# Patient Record
Sex: MALE | URBAN - METROPOLITAN AREA
[De-identification: names, ages, dates, MRNs, and addresses within clinical notes are randomized per-mention and may not be internally consistent; named-entity substitution may affect disease eponyms.]

---

## 2019-04-13 ENCOUNTER — INPATIENT (INPATIENT)
Facility: HOSPITAL | Age: 74
LOS: 11 days | Discharge: HOME HEALTH | DRG: 233 | End: 2019-04-25
Attending: THORACIC SURGERY (CARDIOTHORACIC VASCULAR SURGERY) | Admitting: HOSPITALIST
Payer: MEDICAID

## 2019-04-13 VITALS
RESPIRATION RATE: 16 BRPM | SYSTOLIC BLOOD PRESSURE: 147 MMHG | OXYGEN SATURATION: 95 % | HEART RATE: 82 BPM | DIASTOLIC BLOOD PRESSURE: 100 MMHG

## 2019-04-13 LAB
APTT BLD: 101.3 SEC — HIGH (ref 27.5–36.3)
BASOPHILS # BLD AUTO: 0 K/UL — SIGNIFICANT CHANGE UP (ref 0–0.2)
BASOPHILS NFR BLD AUTO: 0.3 % — SIGNIFICANT CHANGE UP (ref 0–2)
EOSINOPHIL # BLD AUTO: 0 K/UL — SIGNIFICANT CHANGE UP (ref 0–0.5)
EOSINOPHIL NFR BLD AUTO: 0.2 % — SIGNIFICANT CHANGE UP (ref 0–6)
HCT VFR BLD CALC: 41.5 % — SIGNIFICANT CHANGE UP (ref 39–50)
HGB BLD-MCNC: 14.6 G/DL — SIGNIFICANT CHANGE UP (ref 13–17)
INR BLD: 1.26 RATIO — HIGH (ref 0.88–1.16)
LYMPHOCYTES # BLD AUTO: 1.4 K/UL — SIGNIFICANT CHANGE UP (ref 1–3.3)
LYMPHOCYTES # BLD AUTO: 12.5 % — LOW (ref 13–44)
MCHC RBC-ENTMCNC: 33.4 PG — SIGNIFICANT CHANGE UP (ref 27–34)
MCHC RBC-ENTMCNC: 35.3 GM/DL — SIGNIFICANT CHANGE UP (ref 32–36)
MCV RBC AUTO: 94.7 FL — SIGNIFICANT CHANGE UP (ref 80–100)
MONOCYTES # BLD AUTO: 0.7 K/UL — SIGNIFICANT CHANGE UP (ref 0–0.9)
MONOCYTES NFR BLD AUTO: 6.8 % — SIGNIFICANT CHANGE UP (ref 2–14)
NEUTROPHILS # BLD AUTO: 8.7 K/UL — HIGH (ref 1.8–7.4)
NEUTROPHILS NFR BLD AUTO: 80.2 % — HIGH (ref 43–77)
PLATELET # BLD AUTO: 175 K/UL — SIGNIFICANT CHANGE UP (ref 150–400)
PROTHROM AB SERPL-ACNC: 14.5 SEC — HIGH (ref 10–12.9)
RBC # BLD: 4.38 M/UL — SIGNIFICANT CHANGE UP (ref 4.2–5.8)
RBC # FLD: 12.3 % — SIGNIFICANT CHANGE UP (ref 10.3–14.5)
WBC # BLD: 10.9 K/UL — HIGH (ref 3.8–10.5)
WBC # FLD AUTO: 10.9 K/UL — HIGH (ref 3.8–10.5)

## 2019-04-13 PROCEDURE — 99285 EMERGENCY DEPT VISIT HI MDM: CPT | Mod: 25

## 2019-04-13 PROCEDURE — 93010 ELECTROCARDIOGRAM REPORT: CPT

## 2019-04-13 RX ORDER — HEPARIN SODIUM 5000 [USP'U]/ML
INJECTION INTRAVENOUS; SUBCUTANEOUS
Qty: 25000 | Refills: 0 | Status: DISCONTINUED | OUTPATIENT
Start: 2019-04-13 | End: 2019-04-15

## 2019-04-13 RX ORDER — HEPARIN SODIUM 5000 [USP'U]/ML
4000 INJECTION INTRAVENOUS; SUBCUTANEOUS EVERY 6 HOURS
Qty: 0 | Refills: 0 | Status: DISCONTINUED | OUTPATIENT
Start: 2019-04-13 | End: 2019-04-15

## 2019-04-13 RX ADMIN — HEPARIN SODIUM 950 UNIT(S)/HR: 5000 INJECTION INTRAVENOUS; SUBCUTANEOUS at 23:15

## 2019-04-13 NOTE — ED ADULT NURSE NOTE - NSIMPLEMENTINTERV_GEN_ALL_ED
Implemented All Fall with Harm Risk Interventions:  [place] to call system. Call bell, personal items and telephone within reach. Instruct patient to call for assistance. Room bathroom lighting operational. Non-slip footwear when patient is off stretcher. Physically safe environment: no spills, clutter or unnecessary equipment. Stretcher in lowest position, wheels locked, appropriate side rails in place. Provide visual cue, wrist band, yellow gown, etc. Monitor gait and stability. Monitor for mental status changes and reorient to person, place, and time. Review medications for side effects contributing to fall risk. Reinforce activity limits and safety measures with patient and family. Provide visual clues: red socks.

## 2019-04-13 NOTE — ED PROVIDER NOTE - CLINICAL SUMMARY MEDICAL DECISION MAKING FREE TEXT BOX
romel transfer from [facility] cp with rosa maria avr, st depression v4-6 - pos trop - given asa, brelenta and heparin - pt endorses dec cp - cards consult for cath contin ue hepatrin admit tele v ccu v cath lab

## 2019-04-13 NOTE — ED ADULT NURSE NOTE - OBJECTIVE STATEMENT
75 yo m transferred from [facility] for [facility]. PMH of ILDA. 73 yo m transferred from [facility] for NSTEMI. PMH of GERD. Pt is primarily Serbian speaking,  used at this time. Pt reported to [facility] today for vomiting, diaphoresis, & epigastric pain x3 days. Pt was found to have RBBB on EKG & positive troponin. Prior to arrival pt received  mg, 1" Nitroglycerin paste (@ 19:45 that has been removed since pt reported headache), Brilinta (180mg @ 1746), Heparin (5000 unit bolus @ 2121 & 760 units/hr infusing @ 2121) 4 mg of IV Zofran, 20 mg of IV pepcid, 650 mg of PO tylenol, & 2.5 mg of IV metropolol (for elevated BP as per EMS) prior to arrival. Here pt weighed @ 76.6 kg. MD bernal. Rate of heparin infusion adjusted to 950 units/hour as per order by MD Ramirez. Blood work drawn & sent to lab, 18 gauge established in [facility]. 18 gauge in left ac placed prior to arrival. Pt AAOx3, PERRL, NAD, lungs clear bilat, abdomen soft nontender nondistended, strong peripheral pulses x 4, cap refill < 2 seconds, skin warm and dry. Pt denies headache, dizziness, chest pain, palpitations, cough, SOB, abdominal pain, n/v/d, urinary symptoms, fevers, chills, weakness at this time. Pt pending cardiology evaluation. 75 yo m transferred from [facility] for NSTEMI. PMH of GERD. Pt is primarily Czech speaking,  used at this time. Pt reported to [facility] today for vomiting, diaphoresis, & epigastric pain x3 days. Pt was found to have RBBB on EKG & positive troponin. Prior to arrival pt received  mg, 1" Nitroglycerin paste (@ 19:45 that has been removed since pt reported headache), Brilinta (180mg @ 1746), Heparin (5000 unit bolus @ 2121 & 760 units/hr infusion @ 2121) 4 mg of IV Zofran, 20 mg of IV pepcid, 650 mg of PO tylenol, & 2.5 mg of IV Metoprolol (for elevated BP as per EMS) prior to arrival. Here pt weighed @ 76.6 kg. MD bernal. Rate of heparin infusion adjusted to 950 units/hour as per order by MD Ramirez. Blood work drawn & sent to lab, 18 gauge established in [facility]. 18 gauge in left ac placed prior to arrival. Pt AAOx3, PERRL, NAD, lungs clear bilat, abdomen soft nontender nondistended, strong peripheral pulses x 4, cap refill < 2 seconds, skin warm and dry. Pt denies headache, dizziness, chest pain, palpitations, cough, SOB, abdominal pain, n/v/d, urinary symptoms, fevers, chills, weakness at this time. Pt pending cardiology evaluation. 73 yo m transferred from [facility] for NSTEMI. PMH of GERD. Pt is primarily Persian speaking,  used at this time - interpreterID: [id number]//name: Darío. Pt reported to [facility] today for vomiting, diaphoresis, & epigastric pain x3 days. Pt was found to have RBBB on EKG & positive troponin. As per EMS, prior to arrival pt received  mg, 1" Nitroglycerin paste (@ 19:45 that has been removed since pt reported headache), Brilinta (180mg @ 1746), Heparin (5000 unit bolus @ 2121 & 760 units/hr infusion @ 2121) 4 mg of IV Zofran, 20 mg of IV pepcid, 650 mg of PO tylenol, & 2.5 mg of IV Metoprolol (for elevated BP as per EMS) prior to arrival. Here pt weighed @ 76.6 kg. MD bernal. Rate of heparin infusion adjusted to 950 units/hour as per order by MD Ramirez. Blood work drawn & sent to lab, 18 gauge established in [facility]. 18 gauge in left ac placed prior to arrival. Pt AAOx3, PERRL, NAD, lungs clear bilat, abdomen soft nontender nondistended, strong peripheral pulses x 4, cap refill < 2 seconds, skin warm and dry. Pt denies headache, dizziness, chest pain, palpitations, cough, SOB, abdominal pain, n/v/d, urinary symptoms, fevers, chills, weakness at this time. Pt pending cardiology evaluation. 73 yo m transferred from [facility] for NSTEMI. PMH of GERD & HTN. Pt is primarily American speaking,  used at this time - interpreterID: [id number]//name: Darío. Pt reported to [facility] today for vomiting, diaphoresis, & epigastric pain x3 days. Pt was found to have RBBB on EKG & positive troponin. As per EMS, prior to arrival pt received ASA (325 mg), 1" Nitroglycerin paste (that has been removed since pt reported headache), Brilinta (180mg @ 1746), Heparin (5000 unit bolus @ 2121 & 760 units/hr infusion @ 2121) 4 mg of IV Zofran, 20 mg of IV pepcid, 650 mg of PO tylenol, & 2.5 mg of IV Metoprolol (for elevated BP as per EMS) prior to arrival. Here pt weighed @ 76.6 kg. MD bernal. Rate of heparin infusion adjusted to 950 units/hour as per order by MD Ramirez as based on ACS Heparin Nomogram. Blood work drawn & sent to lab, 18 gauge established in [facility]. 18 gauge in left ac placed prior to arrival. Pt AAOx3, PERRL, NAD, respirations spontaneous, non-labored, lungs clear bilat, NSR on CM, abdomen soft nontender nondistended, strong peripheral pulses x 4, cap refill < 2 seconds, skin warm and dry. Pt denies headache, dizziness, chest pain, palpitations, cough, SOB, abdominal pain, n/v/d, urinary symptoms, fevers, chills, weakness at this time. Pt pending cardiology evaluation. 75 yo m transferred from [facility] for Cardiology Evaluation. PMH of GERD & HTN. Pt is primarily Maltese speaking,  used at this time - interpreterID: [id number]//name: Darío. Pt reported to [facility] today for vomiting, diaphoresis, & epigastric pain x3 days. Pt was found to have RBBB on EKG & positive troponin. As per EMS, prior to arrival pt received ASA (325 mg), 1" Nitroglycerin paste (that has been removed since pt reported headache), Brilinta (180mg @ 1746), Heparin (5000 unit bolus @ 2121 & 760 units/hr infusion @ 2121) 4 mg of IV Zofran, 20 mg of IV pepcid, 650 mg of PO tylenol, & 2.5 mg of IV Metoprolol (for elevated BP as per EMS) prior to arrival. Here pt weighed @ 76.6 kg. MD bernal. Rate of heparin infusion adjusted to 950 units/hour as per order by MD Ramirez as based on ACS Heparin Nomogram. Blood work drawn & sent to lab, 18 gauge established in [facility]. 18 gauge in left ac placed prior to arrival. Pt AAOx3, PERRL, NAD, respirations spontaneous, non-labored, lungs clear bilat, NSR on CM, abdomen soft nontender nondistended, strong peripheral pulses x 4, cap refill < 2 seconds, skin warm and dry. Pt denies headache, dizziness, chest pain, palpitations, cough, SOB, abdominal pain, n/v/d, urinary symptoms, fevers, chills, weakness at this time. Pt pending cardiology evaluation.

## 2019-04-13 NOTE — ED PROVIDER NOTE - NS ED ROS FT
Constitutional: no fever, no chills.  Eyes: no visual changes.  ENMT: no sore throat.  CV: +chest pain.  Resp: no cough, no shortness of breath.  GI: no abdominal pain, no nausea, no vomiting, no diarrhea.  : no dysuria, no hematuria.  MSK: no back pain, no neck pain.  Skin: no rashes.  Neuro: no headache, no loss of consciousness, no weakness, no numbness, no tingling.

## 2019-04-13 NOTE — ED PROVIDER NOTE - OBJECTIVE STATEMENT
Resident: 73y M PMH HTN BiBEMS from outside hospital for elevated troponin. Patient is a poor historian, states that when he exerts himself he develops stomach pain and esophagus pain. This has been going on for a long time, but today pain was worse so he went to the hospital. Pain is "in my esophagus," non-radiating. He has a history of a peptic ulcer surgery a long time ago. Does not have any known cardiac history, caths, or stents. Former smoker. Per EMS report, patient received aspirin, Brilinta, nitro, and heparin gtt prior to arrival.

## 2019-04-13 NOTE — ED PROVIDER NOTE - PHYSICAL EXAMINATION
Resident:   Gen: no acute distress  Head: NC, AT  ENT: PERRL, MMM, no uvular deviation, no tonsilar erythema  Neck: supple with full ROM   Chest: CTAB, no retractions, rate normal, appears to breathe comfortably  Heart: RRR S1S2, No peripheral edema, bilateral pulses in arms and legs  Abd: Soft non-tender, no rebound or guarding  Ext: Moving all 4 extremities without obvious impairment to ROM, no obvious weakness  Neuro: fluid speech  Psych: No anxiety, depression or pressured speech noted  Skin: no urticaria, no diffuse rash

## 2019-04-13 NOTE — ED PROVIDER NOTE - PROGRESS NOTE DETAILS
continue heparin DONTE Godfrey MD : endorsed pending cards eval in setting of ekg changes - trops markedly pos, cards made aware again of pos trops. will see in ED. admit for further eval.

## 2019-04-14 DIAGNOSIS — Z29.9 ENCOUNTER FOR PROPHYLACTIC MEASURES, UNSPECIFIED: ICD-10-CM

## 2019-04-14 DIAGNOSIS — Z90.3 ACQUIRED ABSENCE OF STOMACH [PART OF]: Chronic | ICD-10-CM

## 2019-04-14 DIAGNOSIS — N18.3 CHRONIC KIDNEY DISEASE, STAGE 3 (MODERATE): ICD-10-CM

## 2019-04-14 DIAGNOSIS — E87.2 ACIDOSIS: ICD-10-CM

## 2019-04-14 DIAGNOSIS — I10 ESSENTIAL (PRIMARY) HYPERTENSION: ICD-10-CM

## 2019-04-14 DIAGNOSIS — R74.0 NONSPECIFIC ELEVATION OF LEVELS OF TRANSAMINASE AND LACTIC ACID DEHYDROGENASE [LDH]: ICD-10-CM

## 2019-04-14 DIAGNOSIS — I21.4 NON-ST ELEVATION (NSTEMI) MYOCARDIAL INFARCTION: ICD-10-CM

## 2019-04-14 DIAGNOSIS — I21.9 ACUTE MYOCARDIAL INFARCTION, UNSPECIFIED: ICD-10-CM

## 2019-04-14 LAB
ALBUMIN SERPL ELPH-MCNC: 3.8 G/DL — SIGNIFICANT CHANGE UP (ref 3.3–5)
ALP SERPL-CCNC: 83 U/L — SIGNIFICANT CHANGE UP (ref 40–120)
ALT FLD-CCNC: 31 U/L — SIGNIFICANT CHANGE UP (ref 10–45)
ANION GAP SERPL CALC-SCNC: 17 MMOL/L — SIGNIFICANT CHANGE UP (ref 5–17)
APTT BLD: 48 SEC — HIGH (ref 27.5–36.3)
APTT BLD: 64.1 SEC — HIGH (ref 27.5–36.3)
APTT BLD: 73.4 SEC — HIGH (ref 27.5–36.3)
AST SERPL-CCNC: 148 U/L — HIGH (ref 10–40)
BILIRUB SERPL-MCNC: 0.9 MG/DL — SIGNIFICANT CHANGE UP (ref 0.2–1.2)
BUN SERPL-MCNC: 15 MG/DL — SIGNIFICANT CHANGE UP (ref 7–23)
CALCIUM SERPL-MCNC: 9.3 MG/DL — SIGNIFICANT CHANGE UP (ref 8.4–10.5)
CHLORIDE SERPL-SCNC: 100 MMOL/L — SIGNIFICANT CHANGE UP (ref 96–108)
CHOLEST SERPL-MCNC: 156 MG/DL — SIGNIFICANT CHANGE UP (ref 10–199)
CO2 SERPL-SCNC: 21 MMOL/L — LOW (ref 22–31)
CREAT SERPL-MCNC: 1.1 MG/DL — SIGNIFICANT CHANGE UP (ref 0.5–1.3)
GLUCOSE SERPL-MCNC: 163 MG/DL — HIGH (ref 70–99)
HBA1C BLD-MCNC: 5.6 % — SIGNIFICANT CHANGE UP (ref 4–5.6)
HCT VFR BLD CALC: 42.9 % — SIGNIFICANT CHANGE UP (ref 39–50)
HDLC SERPL-MCNC: 34 MG/DL — LOW
HGB BLD-MCNC: 14.4 G/DL — SIGNIFICANT CHANGE UP (ref 13–17)
LIPID PNL WITH DIRECT LDL SERPL: 105 MG/DL — HIGH
MCHC RBC-ENTMCNC: 31.9 PG — SIGNIFICANT CHANGE UP (ref 27–34)
MCHC RBC-ENTMCNC: 33.7 GM/DL — SIGNIFICANT CHANGE UP (ref 32–36)
MCV RBC AUTO: 94.6 FL — SIGNIFICANT CHANGE UP (ref 80–100)
PLATELET # BLD AUTO: 162 K/UL — SIGNIFICANT CHANGE UP (ref 150–400)
POTASSIUM SERPL-MCNC: 4.9 MMOL/L — SIGNIFICANT CHANGE UP (ref 3.5–5.3)
POTASSIUM SERPL-SCNC: 4.9 MMOL/L — SIGNIFICANT CHANGE UP (ref 3.5–5.3)
PROT SERPL-MCNC: 7.5 G/DL — SIGNIFICANT CHANGE UP (ref 6–8.3)
RBC # BLD: 4.53 M/UL — SIGNIFICANT CHANGE UP (ref 4.2–5.8)
RBC # FLD: 12.6 % — SIGNIFICANT CHANGE UP (ref 10.3–14.5)
SODIUM SERPL-SCNC: 138 MMOL/L — SIGNIFICANT CHANGE UP (ref 135–145)
TOTAL CHOLESTEROL/HDL RATIO MEASUREMENT: 4.6 RATIO — SIGNIFICANT CHANGE UP (ref 3.4–9.6)
TRIGL SERPL-MCNC: 84 MG/DL — SIGNIFICANT CHANGE UP (ref 10–149)
TROPONIN T, HIGH SENSITIVITY RESULT: 1179 NG/L — HIGH (ref 0–51)
TROPONIN T, HIGH SENSITIVITY RESULT: 2116 NG/L — HIGH (ref 0–51)
TROPONIN T, HIGH SENSITIVITY RESULT: 2681 NG/L — HIGH (ref 0–51)
WBC # BLD: 10.3 K/UL — SIGNIFICANT CHANGE UP (ref 3.8–10.5)
WBC # FLD AUTO: 10.3 K/UL — SIGNIFICANT CHANGE UP (ref 3.8–10.5)

## 2019-04-14 PROCEDURE — 99223 1ST HOSP IP/OBS HIGH 75: CPT | Mod: GC

## 2019-04-14 PROCEDURE — 99255 IP/OBS CONSLTJ NEW/EST HI 80: CPT

## 2019-04-14 PROCEDURE — 71045 X-RAY EXAM CHEST 1 VIEW: CPT | Mod: 26

## 2019-04-14 RX ORDER — ISOSORBIDE DINITRATE 5 MG/1
5 TABLET ORAL THREE TIMES A DAY
Qty: 0 | Refills: 0 | Status: DISCONTINUED | OUTPATIENT
Start: 2019-04-14 | End: 2019-04-16

## 2019-04-14 RX ORDER — INFLUENZA VIRUS VACCINE 15; 15; 15; 15 UG/.5ML; UG/.5ML; UG/.5ML; UG/.5ML
0.5 SUSPENSION INTRAMUSCULAR ONCE
Qty: 0 | Refills: 0 | Status: DISCONTINUED | OUTPATIENT
Start: 2019-04-14 | End: 2019-04-18

## 2019-04-14 RX ORDER — CLOPIDOGREL BISULFATE 75 MG/1
75 TABLET, FILM COATED ORAL DAILY
Qty: 0 | Refills: 0 | Status: DISCONTINUED | OUTPATIENT
Start: 2019-04-14 | End: 2019-04-14

## 2019-04-14 RX ORDER — TICAGRELOR 90 MG/1
90 TABLET ORAL
Qty: 0 | Refills: 0 | Status: DISCONTINUED | OUTPATIENT
Start: 2019-04-14 | End: 2019-04-15

## 2019-04-14 RX ORDER — ASPIRIN/CALCIUM CARB/MAGNESIUM 324 MG
81 TABLET ORAL DAILY
Qty: 0 | Refills: 0 | Status: DISCONTINUED | OUTPATIENT
Start: 2019-04-14 | End: 2019-04-16

## 2019-04-14 RX ORDER — ATORVASTATIN CALCIUM 80 MG/1
80 TABLET, FILM COATED ORAL AT BEDTIME
Qty: 0 | Refills: 0 | Status: DISCONTINUED | OUTPATIENT
Start: 2019-04-14 | End: 2019-04-18

## 2019-04-14 RX ORDER — LOSARTAN POTASSIUM 100 MG/1
50 TABLET, FILM COATED ORAL DAILY
Qty: 0 | Refills: 0 | Status: DISCONTINUED | OUTPATIENT
Start: 2019-04-14 | End: 2019-04-16

## 2019-04-14 RX ADMIN — TICAGRELOR 90 MILLIGRAM(S): 90 TABLET ORAL at 17:46

## 2019-04-14 RX ADMIN — HEPARIN SODIUM 1100 UNIT(S)/HR: 5000 INJECTION INTRAVENOUS; SUBCUTANEOUS at 18:22

## 2019-04-14 RX ADMIN — HEPARIN SODIUM 1100 UNIT(S)/HR: 5000 INJECTION INTRAVENOUS; SUBCUTANEOUS at 12:06

## 2019-04-14 RX ADMIN — Medication 81 MILLIGRAM(S): at 12:10

## 2019-04-14 RX ADMIN — ISOSORBIDE DINITRATE 5 MILLIGRAM(S): 5 TABLET ORAL at 22:11

## 2019-04-14 RX ADMIN — ISOSORBIDE DINITRATE 5 MILLIGRAM(S): 5 TABLET ORAL at 14:33

## 2019-04-14 RX ADMIN — ATORVASTATIN CALCIUM 80 MILLIGRAM(S): 80 TABLET, FILM COATED ORAL at 22:11

## 2019-04-14 RX ADMIN — LOSARTAN POTASSIUM 50 MILLIGRAM(S): 100 TABLET, FILM COATED ORAL at 05:50

## 2019-04-14 RX ADMIN — TICAGRELOR 90 MILLIGRAM(S): 90 TABLET ORAL at 05:50

## 2019-04-14 RX ADMIN — HEPARIN SODIUM 950 UNIT(S)/HR: 5000 INJECTION INTRAVENOUS; SUBCUTANEOUS at 05:54

## 2019-04-14 NOTE — H&P ADULT - NSHPREVIEWOFSYSTEMS_GEN_ALL_CORE
CONSTITUTIONAL: No weakness, fevers or chills  EYES/ENT: No visual changes;  No vertigo or throat pain   NECK: No pain or stiffness  RESPIRATORY: No cough, wheezing, hemoptysis; No shortness of breath  CARDIOVASCULAR: +Chest pain   No palpitations  GASTROINTESTINAL: +epigastric pain. + nausea,  No vomiting, or hematemesis; No diarrhea or constipation. No melena or hematochezia.  GENITOURINARY: No dysuria, frequency or hematuria  NEUROLOGICAL: No numbness or weakness  SKIN: No itching, burning, rashes, or lesions   All other review of systems is negative unless indicated above.

## 2019-04-14 NOTE — H&P ADULT - NSHPSOCIALHISTORY_GEN_ALL_CORE
Lives in [facility] [place] with wife  Former smoker, approx 5 pack year, stopped approx 25 yrs ago  Social alcohol, beer

## 2019-04-14 NOTE — H&P ADULT - NSHPPHYSICALEXAM_GEN_ALL_CORE
General: Elderly male in NAD sitting upright on stretcher   HEENT: EOMI, MMM  Endo: No palpable thyroid   Card: RRR, No murmur   Resp: CTAB   ABD: Soft, nondistended, nontender, well healed surgical scar RUQ  : No cunha   EXT: No deformity   Neuro: CN 2-12 intact, no gross deficit   Psych: Calm

## 2019-04-14 NOTE — H&P ADULT - PROBLEM SELECTOR PLAN 1
CP, w Ischemic changes on EKG (ST depression of lateral leads, T-wave inversion inferior leads), Uptrending troponin: 1179 -> 2116 -> 2681. Pt currently asymptomatic.   -Interventional Cardiology Team aware- no current plan for urgent cardiac cath today   -Cont heparin gtt/ Brilinta/ ASA daily  -Cont to trend Troponin to peak   -Will start high dose Lipitor   -F/U A1c, Lipid Panel   -ECHO to evaluate for structural heart dz   -Appreciate Cardiology reccs CP, w Ischemic changes on EKG (ST depression of lateral leads, T-wave inversion inferior leads), Uptrending troponin: 1179 -> 2116 -> 2681. Pt currently asymptomatic.   -Interventional Cardiology Team aware- no current plan for urgent cardiac cath today   -Cont heparin gtt/ Brilinta/ ASA daily  -Cont to trend Troponin to peak   -Will start high dose Lipitor   -F/U A1c, Lipid Panel   -ECHO to evaluate for structural heart dz   -Admit to telemetry  -Appreciate Cardiology reccs CP, w Ischemic changes on EKG (ST depression of lateral leads, T-wave inversion inferior leads), Uptrending troponin: 1179 -> 2116 -> 2681. Pt currently asymptomatic.   -Interventional Cardiology Team aware- no current plan for urgent cardiac cath today   -Cont heparin gtt/ Brilinta/ ASA daily  -Cont to trend Troponin to peak   -Will start high dose Lipitor   -Will start Isordil with hold parameters for anginal pain  -F/U A1c, Lipid Panel   -ECHO to evaluate for structural heart dz   -Admit to telemetry  -Appreciate Cardiology reccs CP, w Ischemic changes on EKG (ST depression of lateral leads, T-wave inversion inferior leads), Uptrending troponin: 1179 -> 2116 -> 2681. Pt currently asymptomatic.   -Interventional Cardiology Team aware- no current plan for urgent cardiac cath today, pt currently scheduled for tmrw  -Cont heparin gtt/ Brilinta/ ASA daily  -No utility in trending additonal troponin as per cardiology   -Will start high dose Lipitor   -Will start Isordil with hold parameters for anginal pain  -F/U A1c, Lipid Panel   -ECHO to evaluate for structural heart dz   -Admit to telemetry  -Appreciate Cardiology reccs

## 2019-04-14 NOTE — ED ADULT NURSE REASSESSMENT NOTE - NS ED NURSE REASSESS COMMENT FT1
01:30. A&Ox3. PERRL. No facial droop noted. No slurred speech. Pt upper and lower extremities bilateral in strength. Pt denies headache, dizziness, blurred vision, weakness, numbness/tingling, chest pain, SOB, n/v, abdominal pain, fevers, & chills. VSS. Family at bedside. Safety & comfort measures maintained. Pt pending telemetry bed upstairs.
04:00. pt resting comfortably in stretcher at this time. NAD. Pt denies chest pain, SOB, dizziness, cough, or palpitations. Pt awaiting Cardiology consult at this time.
06:45. Pt resting comfortably in stretcher. VSS. NAD. Pt denies CP, SOB, dizziness, nausea, palpitations, cough, back pain, headache, or blurred vision at this time. Pt awaiting bed upstairs. Safety maintained.
23:30. MD Bear aware of elevated aPTT. Pt to remain on drip at 950 units/hour at this time as per MD. aPtt to be drawn at 6 hour karen and heparin drip to be adjusted according to ACS Heparin Nomogram at that time.
MAR at [facility] pt denies pain repeat ekG completed tp with worsenng T wave depressions cardiology notified pt with cardiology to bedside pt remains alert oriented continuous cardiac monitering in progress
X-ray at bedside.
pt called md to room pt with  phone Иван [phone] used pt told again reasons he must stay in bed and little eating because of heart attack according to lab results this reinforced by md and told over again pt pending tele bed assignment
pt found putting on shirt md at bedside pt assesed with  pt told he must stay in bed pt denies any pain continuous cardiac monitering iin progress
pt remains no chest pain given lunch spoke with patients family who states they r coming to see patient sinus ryhtem on moniter no sob skin warm dry pending tele bed assignment heparin drip continued as ordered no signs of bleeding noted
pt spoke with cardiology at bedside with video  pt given information on what is happening and he is having a heart attack pt given info on angiogram procedure pt pending intervention stat labs w troponin to lab pt continues on heparin drip as ordered continuous cardiac monitering pt denies any discomfort at this time
pt with PTT at 48 pt with heparin drip increased to 1100units hr as per nomogram pt remains pending tele bed assignment no pain remains alert oriented no complaints at this time
spoke with MAR reguarding cardiology coming to see pt he has been made aware of elevated troponin levels pt remains pain free pending tele bed cardiology consult
pt received in red area alert verbal denies any chest pain heparin drip infusing to left arm at 950uniths hr pt sinus rythem on moniter denies sob pending tele bed with cardiology consult pending for NSTEMIcall bell in reach pt continuously monitered

## 2019-04-14 NOTE — H&P ADULT - NSHPLABSRESULTS_GEN_ALL_CORE
14.4   10.3  )-----------( 162      ( 14 Apr 2019 05:13 )             42.9     04-13    138  |  100  |  15  ----------------------------<  163<H>  4.9   |  21<L>  |  1.10    Ca    9.3      13 Apr 2019 23:11    TPro  7.5  /  Alb  3.8  /  TBili  0.9  /  DBili  x   /  AST  148<H>  /  ALT  31  /  AlkPhos  83  04-13          LIVER FUNCTIONS - ( 13 Apr 2019 23:11 )  Alb: 3.8 g/dL / Pro: 7.5 g/dL / ALK PHOS: 83 U/L / ALT: 31 U/L / AST: 148 U/L / GGT: x             PT/INR - ( 13 Apr 2019 23:11 )   PT: 14.5 sec;   INR: 1.26 ratio         PTT - ( 14 Apr 2019 05:13 )  PTT:64.1 sec    < from: Xray Chest 1 View AP/PA (04.14.19 @ 00:26) >    FINDINGS:  Cardiomegaly.    No focal area of consolidation. No pneumothorax or pleural effusions.    Visualized osseous structures are unremarkable.    IMPRESSION: Cardiomegaly. No focal area of consolidation.    < end of copied text >    EKG: NSR, LVH, ST depression lateral leads, t-wave inversion inferior lead

## 2019-04-14 NOTE — H&P ADULT - HISTORY OF PRESENT ILLNESS
74M PMH PUD, HTN BIBEMS, transferred from [facility] after presenting initially with chest pain. Pt states he's had midsternal/epigastric chest pain, rated 9/10 at worst, non-radiating, worst w rest, alleviated by ambulating, associated w nausea and gas. He has had these symptoms for months however pt symptoms worsening in frequency and thus he was brought to the hospital by his daughter whom he has been visiting since 2/2018 from [facility]. Pt found to have EKG, elevated trop and Pro-bnp (>6000), ASA, Brilinta, Hep loaded and transferred to [facility] for further management. 74M PMH GERD, HTN BIBEMS, transferred from [facility] after presenting initially with chest pain. Pt states he's had midsternal/epigastric chest pain, rated 9/10 at worst, non-radiating, worst w rest, alleviated by ambulating, associated w nausea and gas. He has had these symptoms for months however pt symptoms worsening in frequency and thus he was brought to the hospital by his daughter whom he has been visiting since 2/2018 from [facility]. Pt found to have EKG, elevated trop and Pro-bnp (>6000), ASA, Brilinta, Hep loaded and transferred to [facility] for further management.

## 2019-04-14 NOTE — H&P ADULT - ASSESSMENT
75 y/o Hong Konger speaking male PMH GERD, HTN transferred from [facility] with chest pain found to have NSTEMI, currently managed medically, pending ischemic evaluation with cardiac catheretization.

## 2019-04-14 NOTE — H&P ADULT - PROBLEM SELECTOR PLAN 3
DVT PPX: Hep gtt  Diet: DASH Denies diarrhea, unkown if he has renal disease  Check BMP in am and monitor for diarrhea

## 2019-04-14 NOTE — CONSULT NOTE ADULT - SUBJECTIVE AND OBJECTIVE BOX
N-[id number]    CHIEF COMPLAINT:  Patient is a 74y old  Male who presents with a chief complaint of     HISTORY OF PRESENT ILLNESS:  NEYMAR GARCIA is a 74y Male patient with past medical history of *** presenting with ***.     Allergies    No Known Allergies    Intolerances    	    PAST MEDICAL & SURGICAL HISTORY:      FAMILY HISTORY:      SOCIAL HISTORY:    [ ] Non-smoker  [ ] Smoker  [ ] Alcohol    REVIEW OF SYSTEMS:  CONSTITUTIONAL: No fever, weight loss, or fatigue  EYES: No eye pain, visual disturbances, or discharge  ENMT:  No difficulty hearing, tinnitus, vertigo; No sinus or throat pain  NECK: No pain or stiffness  RESPIRATORY: No cough, wheezing, chills or hemoptysis; No Shortness of Breath  CARDIOVASCULAR: No chest pain, palpitations, passing out, dizziness, or leg swelling  GASTROINTESTINAL: No abdominal or epigastric pain. No nausea, vomiting, or hematemesis; No diarrhea or constipation. No melena or hematochezia.  GENITOURINARY: No dysuria, frequency, hematuria, or incontinence  NEUROLOGICAL: No headaches, memory loss, loss of strength, numbness, or tremors  SKIN: No itching, burning, rashes, or lesions   LYMPH Nodes: No enlarged glands  ENDOCRINE: No heat or cold intolerance; No hair loss  MUSCULOSKELETAL: No joint pain or swelling; No muscle, back, or extremity pain  PSYCHIATRIC: No depression, anxiety, mood swings, or difficulty sleeping  HEME/LYMPH: No easy bruising, or bleeding gums  ALLERY AND IMMUNOLOGIC: No hives or eczema	    [ ] All others negative	  [ ] Unable to obtain    I&O's Summary      PHYSICAL EXAM:  Vital Signs Last 24 Hrs  T(C): 37.2 (14 Apr 2019 01:30), Max: 37.2 (14 Apr 2019 01:30)  T(F): 98.9 (14 Apr 2019 01:30), Max: 98.9 (14 Apr 2019 01:30)  HR: 84 (14 Apr 2019 01:30) (82 - 85)  BP: 134/94 (14 Apr 2019 01:30) (134/94 - 147/100)  BP(mean): --  RR: 16 (14 Apr 2019 01:30) (16 - 16)  SpO2: 95% (14 Apr 2019 01:30) (95% - 97%)  Appearance: Normal	  HEENT:   Normal oral mucosa, PERRL, EOMI	  Lymphatic: No lymphadenopathy  Cardiovascular: Normal S1 S2, No JVD, No murmurs, No edema  Respiratory: Lungs clear to auscultation	  Psychiatry: A & O x 3, Mood & affect appropriate  Gastrointestinal:  Soft, Non-tender, + BS	  Skin: No rashes, No ecchymoses, No cyanosis	  Neurologic: Non-focal  Extremities: Normal range of motion, No clubbing, cyanosis or edema  Vascular: Peripheral pulses palpable 2+ bilaterally    MEDICATIONS:  MEDICATIONS  (STANDING):  heparin  Infusion.  Unit(s)/Hr (9.5 mL/Hr) IV Continuous <Continuous>    MEDICATIONS  (PRN):  heparin  Injectable 4000 Unit(s) IV Push every 6 hours PRN For aPTT less than 40      LABS:	 	  CBC Full  -  ( 13 Apr 2019 23:11 )  WBC Count : 10.9 K/uL  Hemoglobin : 14.6 g/dL  Hematocrit : 41.5 %  Platelet Count - Automated : 175 K/uL  Mean Cell Volume : 94.7 fl  Mean Cell Hemoglobin : 33.4 pg  Mean Cell Hemoglobin Concentration : 35.3 gm/dL  Auto Neutrophil # : 8.7 K/uL  Auto Lymphocyte # : 1.4 K/uL  Auto Monocyte # : 0.7 K/uL  Auto Eosinophil # : 0.0 K/uL  Auto Basophil # : 0.0 K/uL  Auto Neutrophil % : 80.2 %  Auto Lymphocyte % : 12.5 %  Auto Monocyte % : 6.8 %  Auto Eosinophil % : 0.2 %  Auto Basophil % : 0.3 %    04-13    138  |  100  |  15  ----------------------------<  163<H>  4.9   |  21<L>  |  1.10    Ca    9.3      13 Apr 2019 23:11    TPro  7.5  /  Alb  3.8  /  TBili  0.9  /  DBili  x   /  AST  148<H>  /  ALT  31  /  AlkPhos  83  04-13    PT/INR - ( 13 Apr 2019 23:11 )   PT: 14.5 sec;   INR: 1.26 ratio         PTT - ( 13 Apr 2019 23:11 )  PTT:101.3 sec        proBNP:   Lipid Profile:   HgA1c:   TSH:     TELEMETRY: 	    ECG:  	  RADIOLOGY:  OTHER: 	    CARDIAC TESTING/STUDIES:    [ ] Echocardiogram:  [ ]  Catheterization:  [ ] Stress Test:  	  	  ASSESSMENT/PLAN: 	      Ramírez Elmore MD, MPH, DADA  Cardiovascular Specialist Attending  [facility]  C: [phone]  E: [email or web address]  (Cardiology Nocturnist cell number available 7 pm - 7 am every night; available daytime week days for follow-up only; daytime weekends covered by general cardiology consult service) MRN-[id number]    CHIEF COMPLAINT:  Chest burning    HISTORY OF PRESENT ILLNESS:  NEYMAR GARCIA is a 74y Male, poor historian presenting with chest burning and found to have NSTEMI. No prior cardiac disease, arrythmia, or heart failure. The patient reports that he has been having burning for the past week intermittently. hs troponin 1179, 2116. ECG with TWI in the lateral leads that is evolving to lateral STD. Started on heparin ggt, aspirin, ticagrelor. Cardiology consulted for further management.     Allergies  No Known Allergies	    PAST MEDICAL & SURGICAL HISTORY:  GERD  No pertinent surgical history     FAMILY HISTORY:  No sudden premature cardiac death.     SOCIAL HISTORY:    Smoker    REVIEW OF SYSTEMS:  CONSTITUTIONAL: No fever, weight loss, or fatigue  EYES: No eye pain, visual disturbances, or discharge  ENMT:  No difficulty hearing, tinnitus  NECK: No pain or stiffness  RESPIRATORY: No cough, wheezing,; No Shortness of Breath  CARDIOVASCULAR: No chest pain, palpitations, passing out  GASTROINTESTINAL: No abdominal. No nausea, vomiting. Positive epigastric burning. Poor appetite   NEUROLOGICAL: No headaches, memory loss, loss of strength  SKIN: No itching, burning, rashes, or lesions   LYMPH Nodes: No enlarged glands  MUSCULOSKELETAL: No joint pain or swelling  PSYCHIATRIC: No depression, anxiety, mood swings  HEME/LYMPH: No easy bruising, or bleeding gums    PHYSICAL EXAM:  Vital Signs Last 24 Hrs  T(C): 37.2 (2019 01:30), Max: 37.2 (2019 01:30)  T(F): 98.9 (2019 01:30), Max: 98.9 (2019 01:30)  HR: 84 (2019 01:30) (82 - 85)  BP: 134/94 (2019 01:30) (134/94 - 147/100)  RR: 16 (2019 01:30) (16 - 16)  SpO2: 95% (2019 01:30) (95% - 97%)    Appearance: Normal	  HEENT:   Normal oral mucosa  Lymphatic: No lymphadenopathy  Cardiovascular: Normal S1 S2, No edema  Respiratory: Lungs clear to auscultation	  Psychiatry: A & O x 3  Gastrointestinal:  Soft, Non-tender  Skin: No rashes, No ecchymoses, No cyanosis	  Neurologic: Non-focal  Extremities: No clubbing, cyanosis or edema  Vascular: Peripheral pulses palpable 2+ bilaterally    MEDICATIONS:  MEDICATIONS  (STANDING):  heparin  Infusion.  Unit(s)/Hr (9.5 mL/Hr) IV Continuous <Continuous>    MEDICATIONS  (PRN):  heparin  Injectable 4000 Unit(s) IV Push every 6 hours PRN For aPTT less than 40    LABS:	 	  CBC Full  -  ( 2019 23:11 )  WBC Count : 10.9 K/uL  Hemoglobin : 14.6 g/dL  Hematocrit : 41.5 %  Platelet Count - Automated : 175 K/uL  Mean Cell Volume : 94.7 fl  Mean Cell Hemoglobin : 33.4 pg  Mean Cell Hemoglobin Concentration : 35.3 gm/dL  Auto Neutrophil # : 8.7 K/uL  Auto Lymphocyte # : 1.4 K/uL  Auto Monocyte # : 0.7 K/uL  Auto Eosinophil # : 0.0 K/uL  Auto Basophil # : 0.0 K/uL  Auto Neutrophil % : 80.2 %  Auto Lymphocyte % : 12.5 %  Auto Monocyte % : 6.8 %  Auto Eosinophil % : 0.2 %  Auto Basophil % : 0.3 %        138  |  100  |  15  ----------------------------<  163<H>  4.9   |  21<L>  |  1.10    Ca    9.3      2019 23:11    TPro  7.5  /  Alb  3.8  /  TBili  0.9  /  DBili  x   /  AST  148<H>  /  ALT  31  /  AlkPhos  83  -13    PT/INR - ( 2019 23:11 )   PT: 14.5 sec;   INR: 1.26 ratio      PTT - ( 2019 23:11 )  PTT:101.3 sec    TELEMETRY: NSR     EC2019  NSR, RBBB, LVH, possible anterolateral infarct.     CARDIAC TESTING/STUDIES:    N/A  	  ASSESSMENT/PLAN: 	  74y Male, poor historian presenting with chest burning and found to have NSTEMI, type I.    1) CAD   hs-troponin elevated   ECG with lateral STD and q waves.   Asymptomatic     ·	Continue medical management with heparin ggt, aspirin 81 mg daily, ticagrelor 90 g BID.   ·	Recommend cardiac cath; will notify interventionalist on-call and plan for either today or Monday morning while he remains asymptomatic.  ·	HgA1c, Lipid panel    Ramírez Elmore MD, MPH, DADA  Cardiovascular Specialist Attending  [facility]  C: [phone]  E: [email or web address]  (Cardiology Nocturnist cell number available 7 pm - 7 am every night; available daytime week days for follow-up only; daytime weekends covered by general cardiology consult service)

## 2019-04-15 LAB
ALBUMIN SERPL ELPH-MCNC: 4 G/DL — SIGNIFICANT CHANGE UP (ref 3.3–5)
ALP SERPL-CCNC: 88 U/L — SIGNIFICANT CHANGE UP (ref 40–120)
ALT FLD-CCNC: 51 U/L — HIGH (ref 10–45)
ANION GAP SERPL CALC-SCNC: 11 MMOL/L — SIGNIFICANT CHANGE UP (ref 5–17)
ANION GAP SERPL CALC-SCNC: 16 MMOL/L — SIGNIFICANT CHANGE UP (ref 5–17)
APPEARANCE UR: CLEAR — SIGNIFICANT CHANGE UP
APTT BLD: 29.1 SEC — SIGNIFICANT CHANGE UP (ref 27.5–36.3)
APTT BLD: 69.9 SEC — HIGH (ref 27.5–36.3)
AST SERPL-CCNC: 108 U/L — HIGH (ref 10–40)
BASOPHILS # BLD AUTO: 0 K/UL — SIGNIFICANT CHANGE UP (ref 0–0.2)
BASOPHILS NFR BLD AUTO: 0.2 % — SIGNIFICANT CHANGE UP (ref 0–2)
BILIRUB SERPL-MCNC: 0.9 MG/DL — SIGNIFICANT CHANGE UP (ref 0.2–1.2)
BILIRUB UR-MCNC: NEGATIVE — SIGNIFICANT CHANGE UP
BUN SERPL-MCNC: 21 MG/DL — SIGNIFICANT CHANGE UP (ref 7–23)
BUN SERPL-MCNC: 21 MG/DL — SIGNIFICANT CHANGE UP (ref 7–23)
CALCIUM SERPL-MCNC: 8.9 MG/DL — SIGNIFICANT CHANGE UP (ref 8.4–10.5)
CALCIUM SERPL-MCNC: 9.2 MG/DL — SIGNIFICANT CHANGE UP (ref 8.4–10.5)
CHLORIDE SERPL-SCNC: 101 MMOL/L — SIGNIFICANT CHANGE UP (ref 96–108)
CHLORIDE SERPL-SCNC: 102 MMOL/L — SIGNIFICANT CHANGE UP (ref 96–108)
CK MB CFR SERPL CALC: 15 NG/ML — HIGH (ref 0–6.7)
CK SERPL-CCNC: 575 U/L — HIGH (ref 30–200)
CO2 SERPL-SCNC: 26 MMOL/L — SIGNIFICANT CHANGE UP (ref 22–31)
CO2 SERPL-SCNC: 28 MMOL/L — SIGNIFICANT CHANGE UP (ref 22–31)
COLOR SPEC: COLORLESS — SIGNIFICANT CHANGE UP
CREAT SERPL-MCNC: 1.35 MG/DL — HIGH (ref 0.5–1.3)
CREAT SERPL-MCNC: 1.36 MG/DL — HIGH (ref 0.5–1.3)
DIFF PNL FLD: NEGATIVE — SIGNIFICANT CHANGE UP
EOSINOPHIL # BLD AUTO: 0.1 K/UL — SIGNIFICANT CHANGE UP (ref 0–0.5)
EOSINOPHIL NFR BLD AUTO: 1.3 % — SIGNIFICANT CHANGE UP (ref 0–6)
FIBRINOGEN PPP-MCNC: 699 MG/DL — HIGH (ref 350–510)
GLUCOSE SERPL-MCNC: 112 MG/DL — HIGH (ref 70–99)
GLUCOSE SERPL-MCNC: 115 MG/DL — HIGH (ref 70–99)
GLUCOSE UR QL: NEGATIVE — SIGNIFICANT CHANGE UP
HBA1C BLD-MCNC: 5.6 % — SIGNIFICANT CHANGE UP (ref 4–5.6)
HCT VFR BLD CALC: 40 % — SIGNIFICANT CHANGE UP (ref 39–50)
HCT VFR BLD CALC: 40.3 % — SIGNIFICANT CHANGE UP (ref 39–50)
HCT VFR BLD CALC: 45 % — SIGNIFICANT CHANGE UP (ref 39–50)
HGB BLD-MCNC: 13.2 G/DL — SIGNIFICANT CHANGE UP (ref 13–17)
HGB BLD-MCNC: 13.4 G/DL — SIGNIFICANT CHANGE UP (ref 13–17)
HGB BLD-MCNC: 14.7 G/DL — SIGNIFICANT CHANGE UP (ref 13–17)
INR BLD: 1.25 RATIO — HIGH (ref 0.88–1.16)
KETONES UR-MCNC: NEGATIVE — SIGNIFICANT CHANGE UP
LEUKOCYTE ESTERASE UR-ACNC: NEGATIVE — SIGNIFICANT CHANGE UP
LYMPHOCYTES # BLD AUTO: 1.3 K/UL — SIGNIFICANT CHANGE UP (ref 1–3.3)
LYMPHOCYTES # BLD AUTO: 13.5 % — SIGNIFICANT CHANGE UP (ref 13–44)
MAGNESIUM SERPL-MCNC: 2.3 MG/DL — SIGNIFICANT CHANGE UP (ref 1.6–2.6)
MAGNESIUM SERPL-MCNC: 2.5 MG/DL — SIGNIFICANT CHANGE UP (ref 1.6–2.6)
MCHC RBC-ENTMCNC: 31.1 PG — SIGNIFICANT CHANGE UP (ref 27–34)
MCHC RBC-ENTMCNC: 31.4 PG — SIGNIFICANT CHANGE UP (ref 27–34)
MCHC RBC-ENTMCNC: 31.9 PG — SIGNIFICANT CHANGE UP (ref 27–34)
MCHC RBC-ENTMCNC: 32.7 GM/DL — SIGNIFICANT CHANGE UP (ref 32–36)
MCHC RBC-ENTMCNC: 32.8 GM/DL — SIGNIFICANT CHANGE UP (ref 32–36)
MCHC RBC-ENTMCNC: 33.5 GM/DL — SIGNIFICANT CHANGE UP (ref 32–36)
MCV RBC AUTO: 95.2 FL — SIGNIFICANT CHANGE UP (ref 80–100)
MCV RBC AUTO: 95.3 FL — SIGNIFICANT CHANGE UP (ref 80–100)
MCV RBC AUTO: 96 FL — SIGNIFICANT CHANGE UP (ref 80–100)
MONOCYTES # BLD AUTO: 1 K/UL — HIGH (ref 0–0.9)
MONOCYTES NFR BLD AUTO: 10.6 % — SIGNIFICANT CHANGE UP (ref 2–14)
NEUTROPHILS # BLD AUTO: 7.2 K/UL — SIGNIFICANT CHANGE UP (ref 1.8–7.4)
NEUTROPHILS NFR BLD AUTO: 74.4 % — SIGNIFICANT CHANGE UP (ref 43–77)
NITRITE UR-MCNC: NEGATIVE — SIGNIFICANT CHANGE UP
NT-PROBNP SERPL-SCNC: 7352 PG/ML — HIGH (ref 0–300)
PH UR: 8 — SIGNIFICANT CHANGE UP (ref 5–8)
PHOSPHATE SERPL-MCNC: 2.6 MG/DL — SIGNIFICANT CHANGE UP (ref 2.5–4.5)
PLATELET # BLD AUTO: 153 K/UL — SIGNIFICANT CHANGE UP (ref 150–400)
PLATELET # BLD AUTO: 157 K/UL — SIGNIFICANT CHANGE UP (ref 150–400)
PLATELET # BLD AUTO: 168 K/UL — SIGNIFICANT CHANGE UP (ref 150–400)
POTASSIUM SERPL-MCNC: 3.4 MMOL/L — LOW (ref 3.5–5.3)
POTASSIUM SERPL-MCNC: 4.4 MMOL/L — SIGNIFICANT CHANGE UP (ref 3.5–5.3)
POTASSIUM SERPL-SCNC: 3.4 MMOL/L — LOW (ref 3.5–5.3)
POTASSIUM SERPL-SCNC: 4.4 MMOL/L — SIGNIFICANT CHANGE UP (ref 3.5–5.3)
PROT SERPL-MCNC: 7.6 G/DL — SIGNIFICANT CHANGE UP (ref 6–8.3)
PROT UR-MCNC: NEGATIVE — SIGNIFICANT CHANGE UP
PROTHROM AB SERPL-ACNC: 14.4 SEC — HIGH (ref 10–12.9)
RBC # BLD: 4.19 M/UL — LOW (ref 4.2–5.8)
RBC # BLD: 4.2 M/UL — SIGNIFICANT CHANGE UP (ref 4.2–5.8)
RBC # BLD: 4.73 M/UL — SIGNIFICANT CHANGE UP (ref 4.2–5.8)
RBC # FLD: 12.2 % — SIGNIFICANT CHANGE UP (ref 10.3–14.5)
RBC # FLD: 12.5 % — SIGNIFICANT CHANGE UP (ref 10.3–14.5)
RBC # FLD: 13 % — SIGNIFICANT CHANGE UP (ref 10.3–14.5)
SODIUM SERPL-SCNC: 141 MMOL/L — SIGNIFICANT CHANGE UP (ref 135–145)
SODIUM SERPL-SCNC: 143 MMOL/L — SIGNIFICANT CHANGE UP (ref 135–145)
SP GR SPEC: 1.01 — SIGNIFICANT CHANGE UP (ref 1.01–1.02)
TROPONIN T, HIGH SENSITIVITY RESULT: 4776 NG/L — HIGH (ref 0–51)
TSH SERPL-MCNC: 3.26 UIU/ML — SIGNIFICANT CHANGE UP (ref 0.27–4.2)
UROBILINOGEN FLD QL: NEGATIVE — SIGNIFICANT CHANGE UP
WBC # BLD: 11.24 K/UL — HIGH (ref 3.8–10.5)
WBC # BLD: 9.5 K/UL — SIGNIFICANT CHANGE UP (ref 3.8–10.5)
WBC # BLD: 9.7 K/UL — SIGNIFICANT CHANGE UP (ref 3.8–10.5)
WBC # FLD AUTO: 11.24 K/UL — HIGH (ref 3.8–10.5)
WBC # FLD AUTO: 9.5 K/UL — SIGNIFICANT CHANGE UP (ref 3.8–10.5)
WBC # FLD AUTO: 9.7 K/UL — SIGNIFICANT CHANGE UP (ref 3.8–10.5)

## 2019-04-15 PROCEDURE — 93458 L HRT ARTERY/VENTRICLE ANGIO: CPT | Mod: 26,GC

## 2019-04-15 PROCEDURE — 93010 ELECTROCARDIOGRAM REPORT: CPT

## 2019-04-15 PROCEDURE — 33967 INSERT I-AORT PERCUT DEVICE: CPT | Mod: GC

## 2019-04-15 PROCEDURE — 99222 1ST HOSP IP/OBS MODERATE 55: CPT

## 2019-04-15 PROCEDURE — 93306 TTE W/DOPPLER COMPLETE: CPT | Mod: 26

## 2019-04-15 PROCEDURE — 99233 SBSQ HOSP IP/OBS HIGH 50: CPT | Mod: GC

## 2019-04-15 PROCEDURE — 99152 MOD SED SAME PHYS/QHP 5/>YRS: CPT | Mod: GC

## 2019-04-15 RX ORDER — HEPARIN SODIUM 5000 [USP'U]/ML
4700 INJECTION INTRAVENOUS; SUBCUTANEOUS EVERY 6 HOURS
Qty: 0 | Refills: 0 | Status: DISCONTINUED | OUTPATIENT
Start: 2019-04-15 | End: 2019-04-18

## 2019-04-15 RX ORDER — CHLORHEXIDINE GLUCONATE 213 G/1000ML
1 SOLUTION TOPICAL
Qty: 0 | Refills: 0 | Status: DISCONTINUED | OUTPATIENT
Start: 2019-04-15 | End: 2019-04-18

## 2019-04-15 RX ORDER — POTASSIUM CHLORIDE 20 MEQ
40 PACKET (EA) ORAL ONCE
Qty: 0 | Refills: 0 | Status: COMPLETED | OUTPATIENT
Start: 2019-04-15 | End: 2019-04-15

## 2019-04-15 RX ORDER — POTASSIUM CHLORIDE 20 MEQ
20 PACKET (EA) ORAL
Qty: 0 | Refills: 0 | Status: DISCONTINUED | OUTPATIENT
Start: 2019-04-15 | End: 2019-04-15

## 2019-04-15 RX ORDER — CHLORHEXIDINE GLUCONATE 213 G/1000ML
1 SOLUTION TOPICAL DAILY
Qty: 0 | Refills: 0 | Status: DISCONTINUED | OUTPATIENT
Start: 2019-04-15 | End: 2019-04-17

## 2019-04-15 RX ORDER — ACETAMINOPHEN 500 MG
650 TABLET ORAL ONCE
Qty: 0 | Refills: 0 | Status: COMPLETED | OUTPATIENT
Start: 2019-04-15 | End: 2019-04-15

## 2019-04-15 RX ORDER — HEPARIN SODIUM 5000 [USP'U]/ML
INJECTION INTRAVENOUS; SUBCUTANEOUS
Qty: 25000 | Refills: 0 | Status: DISCONTINUED | OUTPATIENT
Start: 2019-04-15 | End: 2019-04-18

## 2019-04-15 RX ADMIN — HEPARIN SODIUM 1100 UNIT(S)/HR: 5000 INJECTION INTRAVENOUS; SUBCUTANEOUS at 01:11

## 2019-04-15 RX ADMIN — Medication 40 MILLIEQUIVALENT(S): at 17:39

## 2019-04-15 RX ADMIN — TICAGRELOR 90 MILLIGRAM(S): 90 TABLET ORAL at 05:08

## 2019-04-15 RX ADMIN — ISOSORBIDE DINITRATE 5 MILLIGRAM(S): 5 TABLET ORAL at 12:46

## 2019-04-15 RX ADMIN — LOSARTAN POTASSIUM 50 MILLIGRAM(S): 100 TABLET, FILM COATED ORAL at 05:08

## 2019-04-15 RX ADMIN — ISOSORBIDE DINITRATE 5 MILLIGRAM(S): 5 TABLET ORAL at 05:08

## 2019-04-15 RX ADMIN — HEPARIN SODIUM 950 UNIT(S)/HR: 5000 INJECTION INTRAVENOUS; SUBCUTANEOUS at 22:08

## 2019-04-15 RX ADMIN — ISOSORBIDE DINITRATE 5 MILLIGRAM(S): 5 TABLET ORAL at 22:05

## 2019-04-15 RX ADMIN — Medication 650 MILLIGRAM(S): at 22:01

## 2019-04-15 RX ADMIN — Medication 650 MILLIGRAM(S): at 22:44

## 2019-04-15 RX ADMIN — ATORVASTATIN CALCIUM 80 MILLIGRAM(S): 80 TABLET, FILM COATED ORAL at 22:05

## 2019-04-15 RX ADMIN — Medication 81 MILLIGRAM(S): at 11:23

## 2019-04-15 NOTE — CHART NOTE - NSCHARTNOTEFT_GEN_A_CORE
CCU Accept Note    Transfer from: Floors        Accepting Physician: Dr. Winters      H&P:     74y Male PMH GERD, HTN BIBEMS, transferred from [facility] after presenting initially with chest pain. Pt states he's had midsternal /epigastric chest pain, rated 9/10 at worst, non-radiating, worst w rest, alleviated by ambulating, associated w nausea and gas. He has had these symptoms for months however pt symptoms worsening in frequency and thus he was brought to the hospital by his daughter whom he has been visiting since 2/2018 from [facility]. Pt found to have EKG, elevated trop and Pro-BNP (>6000), ASA, Brilinta, Hep loaded and transferred to [facility] for further management. On 4/15 underwent cardiac cath findings revealed multi vessel CAD; IABP placed and transferred to CCU.  CTS consult called to evaluate patient for cardiac surgery.           ASSESSMENT & PLAN:             FOR FOLLOW UP: CCU Accept Note    Transfer from: Floors    Accepting Physician: Dr. Winters    H&P:     74y Male PMH GERD, HTN BIBEMS, transferred from [facility] after presenting initially with chest pain. Pt states he's had midsternal /epigastric chest pain, rated 9/10 at worst, non-radiating, worst w rest, alleviated by ambulating, associated w nausea and gas. He has had these symptoms for months however pt symptoms worsening in frequency and thus he was brought to the hospital by his daughter whom he has been visiting since 2/2018 from [facility]. Pt found to have EKG, elevated trop and Pro-BNP (>6000), ASA, Brilinta, Hep loaded and transferred to [facility] for further management. On 4/15 underwent cardiac cath findings revealed multi vessel CAD; IABP placed and transferred to CCU.  CTS consult called to evaluate patient for cardiac surgery.           ASSESSMENT & PLAN:   73 yo M with Pmhx of GERD and HTN presents to the ED with NSTEMI, pending evaluation for CT surgery     #NSTEMI  -CP, w Ischemic changes on EKG (ST depression of lateral leads, T-wave inversion inferior leads), Uptrending troponin: 1179 -> 2116 -> 2681. Pt currently asymptomatic.   -Interventional Cardiology Team aware- no current plan for urgent cardiac cath today.   -Cont heparin gtt/ Brilinta/ ASA daily  -No utility in trending additonal troponin as per cardiology   -Will start high dose Lipitor   -Will start Isordil with hold parameters for anginal pain  -F/U A1c, Lipid Panel   -ECHO to evaluate for structural heart dz     #HTN   -Continue with losartan 50 mg    #LEONARD   -Baseline unknown, GFR >65   -Scr uptrending   -Will continue to monitor   -Send urine lytes if it still uptrending CCU Accept Note    Transfer from: Floors    Accepting Physician: Dr. Winters    H&P:     74y Male PMH GERD, HTN BIBEMS, transferred from [facility] after presenting initially with chest pain. Pt states he's had midsternal /epigastric chest pain, rated 9/10 at worst, non-radiating, worst w rest, alleviated by ambulating, associated w nausea and gas. He has had these symptoms for months however pt symptoms worsening in frequency and thus he was brought to the hospital by his daughter whom he has been visiting since 2/2018 from [facility]. Pt found to have EKG, elevated trop and Pro-BNP (>6000), ASA, Brilinta, Hep loaded and transferred to [facility] for further management. On 4/15 underwent cardiac cath findings revealed multi vessel CAD; IABP placed and transferred to CCU.  CTS consult called to evaluate patient for cardiac surgery.           ASSESSMENT & PLAN:   73 yo M with Pmhx of GERD and HTN presents to the ED with NSTEMI s/p cath showing multivessel disease, pending evaluation for CT surgery     #NSTEMI  -CP, w Ischemic changes on EKG (ST depression of lateral leads, T-wave inversion inferior leads), Uptrending troponin: 1179 -> 2116 -> 2681. Pt currently asymptomatic.   -S/p Cath with 99% stenosis in LAD, CX, and OM   -Cont heparin gtt/ Brilinta/ ASA/Lipitor daily  -Will start Isordil with hold parameters for anginal pain  -ECHO to evaluate for structural heart dz   -CT surgery on board, pending surgery     #HTN   -Continue with losartan 50 mg    #LEONARD   -Baseline unknown, GFR >65   -Scr uptrending   -Will continue to monitor   -Send urine lytes if it still uptrending    #PPx  -Hold heparin

## 2019-04-15 NOTE — CONSULT NOTE ADULT - PROBLEM SELECTOR RECOMMENDATION 9
Pre op Cardiac surgery work up in progress   Continue with ASA 81  Start BB if tolerated   Continue with Statin   Type and Screen   Heparin gtt for ACS and IABP   TTE   Carotid duplex Study   PFT's   MRSA / MSSA nasal swab   UA   D/C Plavix / Brilinta   Check P2Y12 in AM   Plan for Cardiac Surgery with Dr. Valdez on Wednesday

## 2019-04-15 NOTE — CONSULT NOTE ADULT - ASSESSMENT
Assessment:  74y Male presents with Myocardial infarction, unspecified MI type, unspecified artery 74y Male presents with NSTEMI multivessel CAD

## 2019-04-15 NOTE — CONSULT NOTE ADULT - ATTENDING COMMENTS
Pt seen and examined.  Unstabel angina  TVD  EF 30%.  STS risk 2-4%.  Risk benefits cabg LAD, OM, PDA  d/w pt.  Agree to proceed Pt seen and examined.  Unstable angina, NQWMI.    TVD  EF 30%.  STS risk 2-4%.  Risk benefits cabg LAD, OM, PDA  d/w pt.  Agree to proceed

## 2019-04-15 NOTE — PROGRESS NOTE ADULT - SUBJECTIVE AND OBJECTIVE BOX
====================  CCU MIDNIGHT ROUNDS  ====================    NEYMAR GARCIA  [id number]  Patient is a 74y old  Male who presents with a chief complaint of NSTEMI (15 Apr 2019 16:49)      ====================  SUMMARY:  ====================  74M with PMH of GERD and HTN presents to ED with NSTEMI s/p cath showing multivessel disease, pending evaluation for CT surgery.    ====================  NEW EVENTS:  ====================  Had some mild groin pain. Was given Tylenol for the pain.      MEDICATIONS  (STANDING):  aspirin  chewable 81 milliGRAM(s) Oral daily  atorvastatin 80 milliGRAM(s) Oral at bedtime  chlorhexidine 2% Cloths 1 Application(s) Topical daily  chlorhexidine 4% Liquid 1 Application(s) Topical <User Schedule>  heparin  Infusion.  Unit(s)/Hr (9.5 mL/Hr) IV Continuous <Continuous>  influenza   Vaccine 0.5 milliLiter(s) IntraMuscular once  isosorbide   dinitrate Tablet (ISORDIL) 5 milliGRAM(s) Oral three times a day  losartan 50 milliGRAM(s) Oral daily    MEDICATIONS  (PRN):  heparin  Injectable 4700 Unit(s) IV Push every 6 hours PRN For aPTT less than 40      ====================  VITALS (Last 12 hrs):  ====================    T(C): 36.9 (04-15-19 @ 20:00), Max: 36.9 (04-15-19 @ 17:15)  T(F): 98.4 (04-15-19 @ 20:00), Max: 98.5 (04-15-19 @ 17:15)  HR: 70 (04-15-19 @ 22:00) (66 - 86)  BP: 126/73 (04-15-19 @ 20:00) (126/73 - 144/81)  BP(mean): 92 (04-15-19 @ 20:00) (92 - 105)  ABP: --  ABP(mean): --  RR: 20 (04-15-19 @ 22:00) (15 - 24)  SpO2: 97% (04-15-19 @ 22:00) (97% - 100%)  Wt(kg): --  CVP(mm Hg): --  CVP(cm H2O): --  CO: --  CI: --  PA: --  PA(mean): --  PCWP: --  SVR: --  PVR: --    I&O's Summary    15 Apr 2019 07:01  -  15 Apr 2019 23:05  --------------------------------------------------------  IN: 489.5 mL / OUT: 750 mL / NET: -260.5 mL            ====================  NEW LABS:  ====================      04-15    143  |  101  |  21  ----------------------------<  115<H>  4.4   |  26  |  1.35<H>    Ca    9.2      15 Apr 2019 19:41  Phos  2.6     04-15  Mg     2.5     04-15    TPro  7.6  /  Alb  4.0  /  TBili  0.9  /  DBili  x   /  AST  108<H>  /  ALT  51<H>  /  AlkPhos  88  04-15    PT/INR - ( 15 Apr 2019 19:41 )   PT: 14.4 sec;   INR: 1.25 ratio         PTT - ( 15 Apr 2019 19:41 )  PTT:29.1 sec  Creatine Kinase, Serum: 575 U/L <H> (04-15-19 @ 11:31)    CKMB Units: 15.0 ng/mL (04-15 @ 11:31)        ====================  PLAN:  ====================  75 yo M with Pmhx of GERD and HTN presents to the ED with NSTEMI s/p cath showing multivessel disease, pending evaluation for CT surgery     #NSTEMI  -CP, w Ischemic changes on EKG (ST depression of lateral leads, T-wave inversion inferior leads), Uptrending troponin: 1179 -> 2116 -> 2681. Pt currently asymptomatic.   -S/p Cath with 99% stenosis in LAD, CX, and OM   -Cont heparin gtt/ Brilinta/ ASA/Lipitor daily  -Will start Isordil with hold parameters for anginal pain  -ECHO to evaluate for structural heart dz   -CT surgery on board, pending surgery     #HTN   -Continue with losartan 50 mg    #LEONARD   -Baseline unknown, GFR >65   -Scr uptrending   -Will continue to monitor   -Send urine lytes if it still uptrending    #PPx  -Hold heparin. ====================  CCU MIDNIGHT ROUNDS  ====================    NEYMAR GARCIA  [id number]  Patient is a 74y old  Male who presents with a chief complaint of NSTEMI (15 Apr 2019 23:05)    ====================  SUMMARY:  ====================  74M with PMH of GERD and HTN presents to the ED with NSTEMI s/p cath (99% stenosis in LAD, LCx, and OM) showing multivessel disease pending evaluation by CTS.       ====================  NEW EVENTS:  ====================  Had some right groin pain where IABP is inserted was given some Tylenol.      ====================  VITALS (Last 12 hrs):  ====================    T(C): 36.9 (04-15-19 @ 20:00), Max: 36.9 (04-15-19 @ 17:15)  T(F): 98.4 (04-15-19 @ 20:00), Max: 98.5 (04-15-19 @ 17:15)  HR: 70 (04-15-19 @ 22:00) (66 - 86)  BP: 126/73 (04-15-19 @ 20:00) (126/73 - 144/81)  BP(mean): 92 (04-15-19 @ 20:00) (92 - 105)  ABP: --  ABP(mean): --  RR: 20 (04-15-19 @ 22:00) (15 - 24)  SpO2: 97% (04-15-19 @ 22:00) (97% - 100%)  Wt(kg): --  CVP(mm Hg): --  CVP(cm H2O): --  CO: --  CI: --  PA: --  PA(mean): --  PCWP: --  SVR: --  PVR: --    I&O's Summary    15 Apr 2019 07:01  -  15 Apr 2019 23:15  --------------------------------------------------------  IN: 489.5 mL / OUT: 750 mL / NET: -260.5 mL    PHYSICAL EXAM:  GENERAL: NAD, well-groomed, well-developed  HEAD: Atraumatic, Normocephalic  EYES: EOMI, PERRLA, conjunctiva and sclera clear  ENMT: No tonsillar erythema, exudates, or enlargement; Moist mucous membranes  NECK: Supple, No JVD, Normal Thyroid  NERVOUS SYSTEM: Alert & Oriented X3, Good concentration; Motor Strength 5/5 B/L upper and lower extremities  CHEST/LUNG: Clear to auscultation bilaterally; No rales, rhonchi, wheezing, or rubs  HEART: Regular rate and rhythm; S1 and S2; No murmurs, rubs, or gallops  ABDOMEN: Soft, Nontender, Nondistended: Bowel sounds present  EXTREMITIES: 2+ Peripheral Pulses, No clubbing, cyanosis, or edema. Right femoral IABP cath with occlusive dressing C/D/I    LYMPH: No lymphadenopathy noted  SKIN: No rashes or lesions        ====================  NEW LABS:  ====================                          14.7   9.7   )-----------( 168      ( 15 Apr 2019 19:41 )             45.0     04-15    143  |  101  |  21  ----------------------------<  115<H>  4.4   |  26  |  1.35<H>    Ca    9.2      15 Apr 2019 19:41  Phos  2.6     04-15  Mg     2.5     04-15    TPro  7.6  /  Alb  4.0  /  TBili  0.9  /  DBili  x   /  AST  108<H>  /  ALT  51<H>  /  AlkPhos  88  04-15    PT/INR - ( 15 Apr 2019 19:41 )   PT: 14.4 sec;   INR: 1.25 ratio         PTT - ( 15 Apr 2019 19:41 )  PTT:29.1 sec  Creatine Kinase, Serum: 575 U/L <H> (04-15-19 @ 11:31)    CKMB Units: 15.0 ng/mL (04-15 @ 11:31)          ====================  A/P:  ====================  73 yo M with Pmhx of GERD and HTN presents to the ED with NSTEMI s/p cath showing multivessel disease, pending evaluation for CT surgery     #NSTEMI  -CP, w Ischemic changes on EKG (ST depression of lateral leads, T-wave inversion inferior leads), Uptrending troponin: 1179 -> 2116 -> 2681. Pt currently asymptomatic.   -S/p Cath with 99% stenosis in LAD, CX, and OM   -Cont heparin gtt/ Brilinta/ ASA/Lipitor daily  -Will start Isordil with hold parameters for anginal pain  -ECHO to evaluate for structural heart dz   -CT surgery on board, pending surgery     #HTN   -Continue with losartan 50 mg    #LEONARD   -Baseline unknown, GFR >65   -Scr uptrending   -Will continue to monitor   -Send urine lytes if it still uptrending    #PPx  -Hold heparin.

## 2019-04-15 NOTE — CONSULT NOTE ADULT - SUBJECTIVE AND OBJECTIVE BOX
History of Present Illness:    74y Male PMH GERD, HTN BIBEMS, transferred from [facility] after presenting initially with chest pain. Pt states he's had midsternal /epigastric chest pain, rated 9/10 at worst, non-radiating, worst w rest, alleviated by ambulating, associated w nausea and gas. He has had these symptoms for months however pt symptoms worsening in frequency and thus he was brought to the hospital by his daughter whom he has been visiting since 2/2018 from [facility]. Pt found to have EKG, elevated trop and Pro-BNP (>6000), ASA, Brilinta, Hep loaded and transferred to [facility] for further management. On 4/15 underwent cardiac cath findings revealed multi vessel CAD; IABP placed and transferred to CCU.  CTS consult called to evaluate patient for cardiac surgery.       Past Medical History  Chronic GERD  HTN (hypertension)      Past Surgical History  History of gastrectomy      MEDICATIONS  (STANDING):  aspirin  chewable 81 milliGRAM(s) Oral daily  atorvastatin 80 milliGRAM(s) Oral at bedtime  heparin  Infusion.  Unit(s)/Hr (9.5 mL/Hr) IV Continuous <Continuous>  influenza   Vaccine 0.5 milliLiter(s) IntraMuscular once  isosorbide   dinitrate Tablet (ISORDIL) 5 milliGRAM(s) Oral three times a day  losartan 50 milliGRAM(s) Oral daily  potassium chloride    Tablet ER 20 milliEquivalent(s) Oral every 2 hours    MEDICATIONS  (PRN):  heparin  Injectable 4000 Unit(s) IV Push every 6 hours PRN For aPTT less than 40    Antiplatelet therapy: Plavix/ Briilinta                          Last dose/amt: 75 mg / 90 mg PO 4/14     Allergies: No Known Allergies      SOCIAL HISTORY:  Smoker: [ ] Yes  [ ] No        PACK YEARS:                         WHEN QUIT?  ETOH use: [ ] Yes  [ ] No              FREQUENCY / QUANTITY:  Ilicit Drug use:  [ ] Yes  [ ] No  Occupation:  Live with:  Assist device use:    Relevant Family History  FAMILY HISTORY:  Family history of diabetes mellitus (DM)  FH: HTN (hypertension)      Review of Systems  GENERAL:  Fevers[] chills[] sweats[] fatigue[] weight loss[] weight gain []                                        NEURO:  parathesias[] seizures []  syncope []  confusion []                                                                                  EYES: glasses[]  blurry vision[]  discharge[] pain[] glaucoma []                                                                            ENMT:  difficulty hearing []  vertigo[]  dysphagia[] epistaxis[] recent dental work []                                      CV:  chest pain[] palpitations[] FLORES [] diaphoresis [] edema[]                                                                                             RESPIRATORY:  wheezing[] SOB[] cough [] sputum[] hemoptysis[]                                                                    GI:  nausea[]  vomiting []  diarrhea[] constipation [] melena []                                                                        : hematuria[ ]  dysuria[ ] urgency[] incontinence[]                                                                                              MUSCULOSKELETAL  arthritis[ ]  joint swelling [ ] muscle weakness [ ]                                                                  SKIN/BREAST:  rash[ ] itching [ ]  hair loss[ ] masses[ ]                                                                                                PSYCH:  dementia [ ] depression [ ] anxiety[ ]                                                                                                                  HEME/LYMPH:  bruises easily[ ] enlarged lymph nodes[ ] tender lymph nodes[ ]                                                 ENDOCRINE:  cold intolerance[ ] heat intolerance[ ] polydipsia[ ]                                                                              PHYSICAL EXAM  Vital Signs Last 24 Hrs  T(C): 36.8 (15 Apr 2019 09:32), Max: 37.4 (14 Apr 2019 22:09)  T(F): 98.3 (15 Apr 2019 09:32), Max: 99.3 (14 Apr 2019 22:09)  HR: 91 (15 Apr 2019 09:32) (85 - 91)  BP: 147/98 (15 Apr 2019 09:32) (145/90 - 156/99)  BP(mean): --  RR: 18 (15 Apr 2019 09:32) (18 - 28)  SpO2: 95% (15 Apr 2019 09:32) (93% - 98%)    General: Well nourished, well developed, no acute distress.                                                         Neuro: Normal exam oriented to person/place & time with no focal motor or sensory  deficits.                    Eyes: Normal exam of conjunctiva & lids, pupils equally reactive.   ENT: Normal exam of nasal/oral mucosa with absence of cyanosis.   Neck: Normal exam of jugular veins, trachea & thyroid.   Chest: Normal lung exam with good air movement absence of wheezes, rales, or rhonchi:                                                                          CV:  Auscultation: normal [ ] S3[ ] S4[ ] Irregular [ ] Rub[ ] Clicks[ ]  Murmurs none:[ ]systolic [ ]  diastolic [ ] holosystolic [ ]  Carotids: No Bruits[ ] Other____________ Abdominal Aorta: normal [ ] nonpalpable[ ]                                                                         GI: Normal exam of abdomen, liver & spleen with no noted masses or tenderness.  (+) BS X 4 Quadrants, Nontender / Non Distended.                                                                                             Extremities: Normal no evidence of cyanosis or deformity Edema: none[ ]trace[ ]1+[ ]2+[ ]3+[ ]4+[ ]  Lower Extremity Pulses: Right[ ] Left[ ]Varicosities[ ]  SKIN : Normal exam to inspection & palpation.                                                           LABS:                        13.4   9.5   )-----------( 157      ( 15 Apr 2019 11:40 )             40.0     04-15    141  |  102  |  21  ----------------------------<  112<H>  3.4<L>   |  28  |  1.36<H>    Ca    8.9      15 Apr 2019 11:31  Mg     2.3     04-15    TPro  7.5  /  Alb  3.8  /  TBili  0.9  /  DBili  x   /  AST  148<H>  /  ALT  31  /  AlkPhos  83  04-13    PT/INR - ( 13 Apr 2019 23:11 )   PT: 14.5 sec;   INR: 1.26 ratio         PTT - ( 15 Apr 2019 00:30 )  PTT:69.9 sec    CARDIAC MARKERS ( 15 Apr 2019 11:31 )  x     / x     / 575 U/L / x     / 15.0 ng/mL History of Present Illness:    74y Male PMH GERD, HTN BIBEMS, transferred from [facility] after presenting initially with chest pain. Pt states he's had midsternal /epigastric chest pain, rated 9/10 at worst, non-radiating, worst w rest, alleviated by ambulating, associated w nausea and gas. He has had these symptoms for months however pt symptoms worsening in frequency and thus he was brought to the hospital by his daughter whom he has been visiting since 2/2018 from [facility]. Pt found to have EKG, elevated trop and Pro-BNP (>6000), ASA, Brilinta, Hep loaded and transferred to [facility] for further management. On 4/15 underwent cardiac cath findings revealed multi vessel CAD; IABP placed and transferred to CCU.  CTS consult called to evaluate patient for cardiac surgery.       Past Medical History  Chronic GERD  HTN (hypertension)      Past Surgical History  History of gastrectomy      MEDICATIONS  (STANDING):  aspirin  chewable 81 milliGRAM(s) Oral daily  atorvastatin 80 milliGRAM(s) Oral at bedtime  heparin  Infusion.  Unit(s)/Hr (9.5 mL/Hr) IV Continuous <Continuous>  influenza   Vaccine 0.5 milliLiter(s) IntraMuscular once  isosorbide   dinitrate Tablet (ISORDIL) 5 milliGRAM(s) Oral three times a day  losartan 50 milliGRAM(s) Oral daily  potassium chloride    Tablet ER 20 milliEquivalent(s) Oral every 2 hours    MEDICATIONS  (PRN):  heparin  Injectable 4000 Unit(s) IV Push every 6 hours PRN For aPTT less than 40    Antiplatelet therapy: Plavix/ Briilinta                          Last dose/amt: 75 mg / 90 mg PO 4/14     Allergies: No Known Allergies      SOCIAL HISTORY:  Smoker: [ ] Yes  [X ] No        PACK YEARS:                         WHEN QUIT?  ETOH use: [X ] Yes  [ ] No              FREQUENCY / QUANTITY: Social   Ilicit Drug use:  [ ] Yes  [X ] No  Occupation: Retired   Live with: Spouse   Assist device use: Denies     Relevant Family History  FAMILY HISTORY:  Family history of diabetes mellitus (DM)  FH: HTN (hypertension)      Review of Systems  GENERAL: Fevers[] chills[] sweats[] fatigue[] weight loss[] weight gain []                                        NEURO: parathesias[] seizures []  syncope []  confusion []                                                                                  EYES: glasses[]  blurry vision[]  discharge[] pain[] glaucoma []                                                                            ENMT: difficulty hearing []  vertigo[]  dysphagia[] epistaxis[] recent dental work []                                      CV:  chest pain[X] palpitations[] FLORES [] diaphoresis [] edema[]                                                                                             RESPIRATORY:  wheezing[] SOB[] cough [] sputum[] hemoptysis[]                                                                    GI:  nausea[]  vomiting []  diarrhea[] constipation [] melena []                                                                        : hematuria[ ]  dysuria[ ] urgency[] incontinence[]                                                                                              MUSCULOSKELETAL  arthritis[ ]  joint swelling [ ] muscle weakness [ ]                                                                  SKIN/BREAST:  rash[ ] itching [ ]  hair loss[ ] masses[ ]                                                                                                PSYCH:  dementia [ ] depression [ ] anxiety[ ]                                                                                                                  HEME/LYMPH:  bruises easily[ ] enlarged lymph nodes[ ] tender lymph nodes[ ]                                                 ENDOCRINE:  cold intolerance[ ] heat intolerance[ ] polydipsia[ ]                                                                              PHYSICAL EXAM  Vital Signs Last 24 Hrs  T(C): 36.8 (15 Apr 2019 09:32), Max: 37.4 (14 Apr 2019 22:09)  T(F): 98.3 (15 Apr 2019 09:32), Max: 99.3 (14 Apr 2019 22:09)  HR: 91 (15 Apr 2019 09:32) (85 - 91)  BP: 147/98 (15 Apr 2019 09:32) (145/90 - 156/99)  BP(mean): --  RR: 18 (15 Apr 2019 09:32) (18 - 28)  SpO2: 95% (15 Apr 2019 09:32) (93% - 98%)    General: Well nourished, well developed, no acute distress.                                                         Neuro: Normal exam oriented to person/place & time with no focal motor or sensory  deficits.                    Eyes: Normal exam of conjunctiva & lids, pupils equally reactive.   ENT: Normal exam of nasal/oral mucosa with absence of cyanosis.   Neck: Normal exam of jugular veins, trachea & thyroid.   Chest: Normal lung exam with good air movement absence of wheezes, rales, or rhonchi:                                                                          CV: Auscultation: normal [X ] S3[ ] S4[ ] Irregular [ ] Rub[ ] Clicks[ ]  Murmurs none:[X ]systolic [ ]  diastolic [ ] holosystolic [ ]  Carotids: No Bruits[- ] Other____________ Abdominal Aorta: normal [X ] nonpalpable[X ]                                                                         GI: Normal exam of abdomen, liver & spleen with no noted masses or tenderness.  (+) BS X 4 Quadrants, Nontender / Non Distended.                                                                                             Extremities: Normal no evidence of cyanosis or deformity Edema: none[X]trace[ ]1+[ ]2+[ ]3+[ ]4+[ ]  Lower Extremity Pulses: Right[+2 ] Left[+2 ]Varicosities[- ] Right femoral IABP cath with occlusive dressing C/D/I    SKIN : Normal exam to inspection & palpation.                                                           LABS:                        13.4   9.5   )-----------( 157      ( 15 Apr 2019 11:40 )             40.0     04-15    141  |  102  |  21  ----------------------------<  112<H>  3.4<L>   |  28  |  1.36<H>    Ca    8.9      15 Apr 2019 11:31  Mg     2.3     04-15    TPro  7.5  /  Alb  3.8  /  TBili  0.9  /  DBili  x   /  AST  148<H>  /  ALT  31  /  AlkPhos  83  04-13    PT/INR - ( 13 Apr 2019 23:11 )   PT: 14.5 sec;   INR: 1.26 ratio         PTT - ( 15 Apr 2019 00:30 )  PTT:69.9 sec    CARDIAC MARKERS ( 15 Apr 2019 11:31 )  x     / x     / 575 U/L / x     / 15.0 ng/mL    Cardiac Cath preliminary:  LAD 99%, Cx 99%, OM 99%, and RCA 80%.

## 2019-04-16 PROBLEM — K21.9 GASTRO-ESOPHAGEAL REFLUX DISEASE WITHOUT ESOPHAGITIS: Chronic | Status: ACTIVE | Noted: 2019-04-14

## 2019-04-16 PROBLEM — I10 ESSENTIAL (PRIMARY) HYPERTENSION: Chronic | Status: ACTIVE | Noted: 2019-04-14

## 2019-04-16 LAB
ALBUMIN SERPL ELPH-MCNC: 3.6 G/DL — SIGNIFICANT CHANGE UP (ref 3.3–5)
ALP SERPL-CCNC: 80 U/L — SIGNIFICANT CHANGE UP (ref 40–120)
ALT FLD-CCNC: 43 U/L — SIGNIFICANT CHANGE UP (ref 10–45)
ANION GAP SERPL CALC-SCNC: 14 MMOL/L — SIGNIFICANT CHANGE UP (ref 5–17)
APTT BLD: 43.7 SEC — HIGH (ref 27.5–36.3)
APTT BLD: 57 SEC — HIGH (ref 27.5–36.3)
APTT BLD: 70.3 SEC — HIGH (ref 27.5–36.3)
AST SERPL-CCNC: 74 U/L — HIGH (ref 10–40)
BASOPHILS # BLD AUTO: 0 K/UL — SIGNIFICANT CHANGE UP (ref 0–0.2)
BASOPHILS NFR BLD AUTO: 0.4 % — SIGNIFICANT CHANGE UP (ref 0–2)
BILIRUB SERPL-MCNC: 0.9 MG/DL — SIGNIFICANT CHANGE UP (ref 0.2–1.2)
BLD GP AB SCN SERPL QL: NEGATIVE — SIGNIFICANT CHANGE UP
BUN SERPL-MCNC: 21 MG/DL — SIGNIFICANT CHANGE UP (ref 7–23)
CALCIUM SERPL-MCNC: 8.6 MG/DL — SIGNIFICANT CHANGE UP (ref 8.4–10.5)
CHLORIDE SERPL-SCNC: 103 MMOL/L — SIGNIFICANT CHANGE UP (ref 96–108)
CK MB BLD-MCNC: 2.1 % — SIGNIFICANT CHANGE UP (ref 0–3.5)
CK MB CFR SERPL CALC: 6.4 NG/ML — SIGNIFICANT CHANGE UP (ref 0–6.7)
CK SERPL-CCNC: 307 U/L — HIGH (ref 30–200)
CO2 SERPL-SCNC: 24 MMOL/L — SIGNIFICANT CHANGE UP (ref 22–31)
CREAT SERPL-MCNC: 1.46 MG/DL — HIGH (ref 0.5–1.3)
EOSINOPHIL # BLD AUTO: 0.2 K/UL — SIGNIFICANT CHANGE UP (ref 0–0.5)
EOSINOPHIL NFR BLD AUTO: 2.6 % — SIGNIFICANT CHANGE UP (ref 0–6)
GLUCOSE SERPL-MCNC: 103 MG/DL — HIGH (ref 70–99)
HBA1C BLD-MCNC: 5.6 % — SIGNIFICANT CHANGE UP (ref 4–5.6)
HCT VFR BLD CALC: 40 % — SIGNIFICANT CHANGE UP (ref 39–50)
HGB BLD-MCNC: 13.6 G/DL — SIGNIFICANT CHANGE UP (ref 13–17)
INR BLD: 1.39 RATIO — HIGH (ref 0.88–1.16)
LYMPHOCYTES # BLD AUTO: 1.5 K/UL — SIGNIFICANT CHANGE UP (ref 1–3.3)
LYMPHOCYTES # BLD AUTO: 19.4 % — SIGNIFICANT CHANGE UP (ref 13–44)
MAGNESIUM SERPL-MCNC: 2.2 MG/DL — SIGNIFICANT CHANGE UP (ref 1.6–2.6)
MCHC RBC-ENTMCNC: 32.4 PG — SIGNIFICANT CHANGE UP (ref 27–34)
MCHC RBC-ENTMCNC: 34.1 GM/DL — SIGNIFICANT CHANGE UP (ref 32–36)
MCV RBC AUTO: 94.9 FL — SIGNIFICANT CHANGE UP (ref 80–100)
MONOCYTES # BLD AUTO: 0.9 K/UL — SIGNIFICANT CHANGE UP (ref 0–0.9)
MONOCYTES NFR BLD AUTO: 10.8 % — SIGNIFICANT CHANGE UP (ref 2–14)
MRSA PCR RESULT.: SIGNIFICANT CHANGE UP
NEUTROPHILS # BLD AUTO: 5.3 K/UL — SIGNIFICANT CHANGE UP (ref 1.8–7.4)
NEUTROPHILS NFR BLD AUTO: 66.8 % — SIGNIFICANT CHANGE UP (ref 43–77)
PA ADP PRP-ACNC: 122 PRU — LOW (ref 194–417)
PA ADP PRP-ACNC: 131 PRU — LOW (ref 194–417)
PHOSPHATE SERPL-MCNC: 2.6 MG/DL — SIGNIFICANT CHANGE UP (ref 2.5–4.5)
PLATELET # BLD AUTO: 165 K/UL — SIGNIFICANT CHANGE UP (ref 150–400)
POTASSIUM SERPL-MCNC: 3.5 MMOL/L — SIGNIFICANT CHANGE UP (ref 3.5–5.3)
POTASSIUM SERPL-SCNC: 3.5 MMOL/L — SIGNIFICANT CHANGE UP (ref 3.5–5.3)
PROT SERPL-MCNC: 6.7 G/DL — SIGNIFICANT CHANGE UP (ref 6–8.3)
PROTHROM AB SERPL-ACNC: 16 SEC — HIGH (ref 10–12.9)
RBC # BLD: 4.21 M/UL — SIGNIFICANT CHANGE UP (ref 4.2–5.8)
RBC # FLD: 12.1 % — SIGNIFICANT CHANGE UP (ref 10.3–14.5)
RH IG SCN BLD-IMP: POSITIVE — SIGNIFICANT CHANGE UP
S AUREUS DNA NOSE QL NAA+PROBE: SIGNIFICANT CHANGE UP
SODIUM SERPL-SCNC: 141 MMOL/L — SIGNIFICANT CHANGE UP (ref 135–145)
T3 SERPL-MCNC: 84 NG/DL — SIGNIFICANT CHANGE UP (ref 80–200)
T4 AB SER-ACNC: 7.1 UG/DL — SIGNIFICANT CHANGE UP (ref 4.6–12)
TROPONIN T, HIGH SENSITIVITY RESULT: 8047 NG/L — HIGH (ref 0–51)
TSH SERPL-MCNC: 4.41 UIU/ML — HIGH (ref 0.27–4.2)
WBC # BLD: 8 K/UL — SIGNIFICANT CHANGE UP (ref 3.8–10.5)
WBC # FLD AUTO: 8 K/UL — SIGNIFICANT CHANGE UP (ref 3.8–10.5)

## 2019-04-16 PROCEDURE — 93010 ELECTROCARDIOGRAM REPORT: CPT

## 2019-04-16 PROCEDURE — 99233 SBSQ HOSP IP/OBS HIGH 50: CPT | Mod: GC

## 2019-04-16 PROCEDURE — 94010 BREATHING CAPACITY TEST: CPT | Mod: 26

## 2019-04-16 PROCEDURE — 93880 EXTRACRANIAL BILAT STUDY: CPT | Mod: 26

## 2019-04-16 PROCEDURE — 71045 X-RAY EXAM CHEST 1 VIEW: CPT | Mod: 26

## 2019-04-16 PROCEDURE — 99232 SBSQ HOSP IP/OBS MODERATE 35: CPT

## 2019-04-16 RX ORDER — POTASSIUM CHLORIDE 20 MEQ
40 PACKET (EA) ORAL ONCE
Qty: 0 | Refills: 0 | Status: COMPLETED | OUTPATIENT
Start: 2019-04-16 | End: 2019-04-16

## 2019-04-16 RX ORDER — HYDRALAZINE HCL 50 MG
10 TABLET ORAL THREE TIMES A DAY
Qty: 0 | Refills: 0 | Status: DISCONTINUED | OUTPATIENT
Start: 2019-04-16 | End: 2019-04-17

## 2019-04-16 RX ORDER — ISOSORBIDE DINITRATE 5 MG/1
10 TABLET ORAL THREE TIMES A DAY
Qty: 0 | Refills: 0 | Status: DISCONTINUED | OUTPATIENT
Start: 2019-04-16 | End: 2019-04-18

## 2019-04-16 RX ORDER — CHLORHEXIDINE GLUCONATE 213 G/1000ML
15 SOLUTION TOPICAL
Qty: 0 | Refills: 0 | Status: DISCONTINUED | OUTPATIENT
Start: 2019-04-16 | End: 2019-04-17

## 2019-04-16 RX ORDER — CEFAZOLIN SODIUM 1 G
2000 VIAL (EA) INJECTION ONCE
Qty: 0 | Refills: 0 | Status: DISCONTINUED | OUTPATIENT
Start: 2019-04-16 | End: 2019-04-17

## 2019-04-16 RX ADMIN — Medication 10 MILLIGRAM(S): at 05:44

## 2019-04-16 RX ADMIN — Medication 40 MILLIEQUIVALENT(S): at 05:49

## 2019-04-16 RX ADMIN — ISOSORBIDE DINITRATE 10 MILLIGRAM(S): 5 TABLET ORAL at 05:44

## 2019-04-16 RX ADMIN — Medication 10 MILLIGRAM(S): at 21:35

## 2019-04-16 RX ADMIN — HEPARIN SODIUM 1100 UNIT(S)/HR: 5000 INJECTION INTRAVENOUS; SUBCUTANEOUS at 18:22

## 2019-04-16 RX ADMIN — Medication 10 MILLIGRAM(S): at 13:13

## 2019-04-16 RX ADMIN — HEPARIN SODIUM 1100 UNIT(S)/HR: 5000 INJECTION INTRAVENOUS; SUBCUTANEOUS at 12:09

## 2019-04-16 RX ADMIN — ISOSORBIDE DINITRATE 10 MILLIGRAM(S): 5 TABLET ORAL at 21:35

## 2019-04-16 RX ADMIN — Medication 81 MILLIGRAM(S): at 11:32

## 2019-04-16 RX ADMIN — CHLORHEXIDINE GLUCONATE 1 APPLICATION(S): 213 SOLUTION TOPICAL at 07:22

## 2019-04-16 RX ADMIN — HEPARIN SODIUM 1100 UNIT(S)/HR: 5000 INJECTION INTRAVENOUS; SUBCUTANEOUS at 05:44

## 2019-04-16 RX ADMIN — ATORVASTATIN CALCIUM 80 MILLIGRAM(S): 80 TABLET, FILM COATED ORAL at 21:35

## 2019-04-16 RX ADMIN — ISOSORBIDE DINITRATE 10 MILLIGRAM(S): 5 TABLET ORAL at 13:13

## 2019-04-16 NOTE — PROGRESS NOTE ADULT - ASSESSMENT
73 y/o Kazakh speaking male PMH GERD, HTN transferred from [facility] with chest pain found to have NSTEMI, currently managed medically, pending ischemic evaluation with cardiac catheretization.

## 2019-04-16 NOTE — PROGRESS NOTE ADULT - SUBJECTIVE AND OBJECTIVE BOX
Cardiac Surgery Pre-op Note:    CC: Patient is a 74y old  Male who presents with a chief complaint of NSTEMI (2019 06:56)                                                                                                             Surgeon: Courtney    Procedure: CABG    Allergies    No Known Allergies    Intolerances        HPI:  74M PMH GERD, HTN BIBEMS, transferred from [facility] after presenting initially with chest pain. Pt states he's had midsternal/epigastric chest pain, rated 9/10 at worst, non-radiating, worst w rest, alleviated by ambulating, associated w nausea and gas. He has had these symptoms for months however pt symptoms worsening in frequency and thus he was brought to the hospital by his daughter whom he has been visiting since 2018 from [facility]. Pt found to have EKG, elevated trop and Pro-bnp (>6000), ASA, Brilinta, Hep loaded and transferred to [facility] for further management. (2019 10:24)      PAST MEDICAL & SURGICAL HISTORY:  Chronic GERD  HTN (hypertension)  History of gastrectomy      MEDICATIONS  (STANDING):  atorvastatin 80 milliGRAM(s) Oral at bedtime  ceFAZolin   IVPB 2000 milliGRAM(s) IV Intermittent once  chlorhexidine 0.12% Liquid 15 milliLiter(s) Swish and Spit two times a day  chlorhexidine 2% Cloths 1 Application(s) Topical daily  chlorhexidine 4% Liquid 1 Application(s) Topical <User Schedule>  heparin  Infusion.  Unit(s)/Hr (9.5 mL/Hr) IV Continuous <Continuous>  hydrALAZINE 10 milliGRAM(s) Oral three times a day  influenza   Vaccine 0.5 milliLiter(s) IntraMuscular once  isosorbide   dinitrate Tablet (ISORDIL) 10 milliGRAM(s) Oral three times a day    MEDICATIONS  (PRN):  heparin  Injectable 4700 Unit(s) IV Push every 6 hours PRN For aPTT less than 40        Labs:                        13.6   8.0   )-----------( 165      ( 2019 04:35 )             40.0     04-16    141  |  103  |  21  ----------------------------<  103<H>  3.5   |  24  |  1.46<H>    Ca    8.6      2019 04:35  Phos  2.6     04-16  Mg     2.2     04-16    TPro  6.7  /  Alb  3.6  /  TBili  0.9  /  DBili  x   /  AST  74<H>  /  ALT  43  /  AlkPhos  80      PT/INR - ( 2019 04:35 )   PT: 16.0 sec;   INR: 1.39 ratio    P2Y12; P2Y12 Plt Response Test . (19 @ 13:55)    P2Y12 Plt Reactivity: 131:   PTT - ( 2019 11:53 )  PTT:70.3 sec    Blood Type: ABO Interpretation: O ( @ 04:51)    HGB A1C: Hemoglobin A1C, Whole Blood: 5.6 % (04-15 @ 22:40)    Prealbumin:   Pro-BNP: Serum Pro-Brain Natriuretic Peptide: 7352 pg/mL (04-15 @ 11:31)    Thyroid Panel: 04-15 @ 22:40/4.41  --/7.1/84  04-15 @ 15:53/3.26  --/--/--    MRSA: MRSA PCR Result.: Trung ( @ 08:48)   / MSSA:   Urinalysis Basic - ( 15 Apr 2019 19:41 )    Color: Colorless / Appearance: Clear / S.013 / pH: x  Gluc: x / Ketone: Negative  / Bili: Negative / Urobili: Negative   Blood: x / Protein: Negative / Nitrite: Negative   Leuk Esterase: Negative / RBC: x / WBC x   Sq Epi: x / Non Sq Epi: x / Bacteria: x        CXR: < from: Xray Chest 1 View AP/PA (19 @ 10:10) >  The heart is enlarged. The lungs are clear. Intra-aortic balloon pump is   1.5 cm below the aortic knob. No pneumothorax.    < end of copied text >      EKG: < from: 12 Lead ECG (19 @ 09:00) >  NORMAL SINUS RHYTHM  POSSIBLE LEFT ATRIAL ENLARGEMENT  RIGHT BUNDLE BRANCH BLOCK  LEFT ANTERIOR FASCICULAR BLOCK  *** BIFASCICULAR BLOCK ***  LEFT VENTRICULAR HYPERTROPHY  ANTEROLATERAL INFARCT , AGE UNDETERMINED  T WAVE ABNORMALITY, CONSIDER INFERIOR ISCHEMIA  ABNORMAL ECG    < end of copied text >      Carotid Duplex:  < from: VA Duplex Carotid, Bilat (19 @ 14:28) >  No hemodynamically significant stenosis seen within the bilateral   internal carotid arteries.      < end of copied text >      PFT's:    Echocardiogram:  < from: TTE with Doppler (w/Cont) (04.15.19 @ 16:20) >  1. Severe global left ventricular systolic dysfunction with  regional variation. Endocardial visualization enhanced with  intravenous injection of Ultrasonic Enhancing Agent  (Definity). No left ventricular thrombus.  2. Severe reversible diastolic dysfunction (Stage III).  3. The right ventricle is not well visualized; grossly  decreased right ventricular systolic function. TV s' = 8.0  cm/sec.  4. Estimated right ventricular systolic pressure equals 27  mm Hg, assuming right atrial pressure equals 8 mm Hg,  consistent with normal pulmonary pressures.    < end of copied text >    Cardiac catheterization:    Reported 3 VCAD    Vein Mapping:    Gen: WN/WD NAD  Neuro: AAOx3, nonfocal  Pulm: CTA B/L  CV: RRR, S1S2  Abd: Soft, NT, ND +BS  Ext: No edema, + peripheral pulses  IABP insitu  1:1  *** Will repeat P2Y12 am  possible 2nd case if value increases above 175> Dr Valdez will re evaluate am    Pt has AICD/PPM [ ] Yes  [x ] No             Brand Name:  Pre-op Beta Blocker ordered within 24 hrs of surgery?  [ ] Yes  [x ] No  If not, Why?  Hypotensive w/ IABP insitu  Type & Cross  [x ] Yes  [ ] No  NPO after Midnight [ x] Yes  [ ] No  Pre-op ABX ordered, to be taped on chart:  [ x] Yes  [ ] No     Hibiclens/Peridex ordered [ ] Yes  [ ] No  Intraop on Hold: PRBCs, CXR, CHRISTINE [ ]   Consent obtained  [ ] Yes  [ ] No

## 2019-04-16 NOTE — PROGRESS NOTE ADULT - SUBJECTIVE AND OBJECTIVE BOX
Patient is a 74y old  Male who presents with a chief complaint of NSTEMI (15 Apr 2019 23:05)        SUBJECTIVE / OVERNIGHT EVENTS:      MEDICATIONS  (STANDING):  aspirin  chewable 81 milliGRAM(s) Oral daily  atorvastatin 80 milliGRAM(s) Oral at bedtime  chlorhexidine 2% Cloths 1 Application(s) Topical daily  chlorhexidine 4% Liquid 1 Application(s) Topical <User Schedule>  heparin  Infusion.  Unit(s)/Hr (9.5 mL/Hr) IV Continuous <Continuous>  hydrALAZINE 10 milliGRAM(s) Oral three times a day  influenza   Vaccine 0.5 milliLiter(s) IntraMuscular once  isosorbide   dinitrate Tablet (ISORDIL) 10 milliGRAM(s) Oral three times a day    MEDICATIONS  (PRN):  heparin  Injectable 4700 Unit(s) IV Push every 6 hours PRN For aPTT less than 40        CAPILLARY BLOOD GLUCOSE        I&O's Summary    15 Apr 2019 07:01  -  2019 06:57  --------------------------------------------------------  IN: 677.5 mL / OUT: 1250 mL / NET: -572.5 mL        PHYSICAL EXAM  GENERAL: NAD, well-developed  HEAD:  Atraumatic, Normocephalic  EYES: EOMI, PERRLA, conjunctiva and sclera clear  NECK: Supple, No JVD  CHEST/LUNG: Clear to auscultation bilaterally; No wheeze  HEART: Regular rate and rhythm; No murmurs, rubs, or gallops  ABDOMEN: Soft, Nontender, Nondistended; Bowel sounds present  EXTREMITIES:  2+ Peripheral Pulses, No clubbing, cyanosis, or edema  PSYCH: AAOx3  SKIN: No rashes or lesions    LABS:                        13.6   8.0   )-----------( 165      ( 2019 04:35 )             40.0     04-16    141  |  103  |  21  ----------------------------<  103<H>  3.5   |  24  |  1.46<H>    Ca    8.6      2019 04:35  Phos  2.6     04-16  Mg     2.2     04-16    TPro  6.7  /  Alb  3.6  /  TBili  0.9  /  DBili  x   /  AST  74<H>  /  ALT  43  /  AlkPhos  80  04-16    PT/INR - ( 2019 04:35 )   PT: 16.0 sec;   INR: 1.39 ratio         PTT - ( 2019 04:35 )  PTT:43.7 sec  CARDIAC MARKERS ( 15 Apr 2019 11:31 )  x     / x     / 575 U/L / x     / 15.0 ng/mL      Urinalysis Basic - ( 15 Apr 2019 19:41 )    Color: Colorless / Appearance: Clear / S.013 / pH: x  Gluc: x / Ketone: Negative  / Bili: Negative / Urobili: Negative   Blood: x / Protein: Negative / Nitrite: Negative   Leuk Esterase: Negative / RBC: x / WBC x   Sq Epi: x / Non Sq Epi: x / Bacteria: x        RADIOLOGY & ADDITIONAL TESTS:    Imaging Personally Reviewed:  Consultant(s) Notes Reviewed:    Care Discussed with Consultants/Other Providers: Patient is a 74y old  Male who presents with a chief complaint of NSTEMI (15 Apr 2019 23:05)      SUBJECTIVE / OVERNIGHT EVENTS: No acute overnight events. Pt denies any N/V/D, chest pain, or abdominal pain overnight. He had a few questions about his surgery tomorrow such as risks and what his physical functioning would be afterwards.      MEDICATIONS  (STANDING):  aspirin  chewable 81 milliGRAM(s) Oral daily  atorvastatin 80 milliGRAM(s) Oral at bedtime  chlorhexidine 2% Cloths 1 Application(s) Topical daily  chlorhexidine 4% Liquid 1 Application(s) Topical <User Schedule>  heparin  Infusion.  Unit(s)/Hr (9.5 mL/Hr) IV Continuous <Continuous>  hydrALAZINE 10 milliGRAM(s) Oral three times a day  influenza   Vaccine 0.5 milliLiter(s) IntraMuscular once  isosorbide   dinitrate Tablet (ISORDIL) 10 milliGRAM(s) Oral three times a day    MEDICATIONS  (PRN):  heparin  Injectable 4700 Unit(s) IV Push every 6 hours PRN For aPTT less than 40        CAPILLARY BLOOD GLUCOSE        I&O's Summary    15 Apr 2019 07:01  -  2019 06:57  --------------------------------------------------------  IN: 677.5 mL / OUT: 1250 mL / NET: -572.5 mL        PHYSICAL EXAM  GENERAL: NAD, well-developed  HEAD:  Atraumatic, Normocephalic  EYES: EOMI, PERRLA, conjunctiva and sclera clear  NECK: Supple, No JVD  CHEST/LUNG: Clear to auscultation bilaterally; No wheeze  HEART: Regular rate and rhythm; diastolic murmur?  ABDOMEN: Soft, Nontender, Nondistended; Bowel sounds present  EXTREMITIES:  2+ Peripheral Pulses, No clubbing, cyanosis, or edema  PSYCH: AAOx3  SKIN: No rashes or lesions    LABS:                        13.6   8.0   )-----------( 165      ( 2019 04:35 )             40.0     04-16    141  |  103  |  21  ----------------------------<  103<H>  3.5   |  24  |  1.46<H>    Ca    8.6      2019 04:35  Phos  2.6     04-16  Mg     2.2     04-16    TPro  6.7  /  Alb  3.6  /  TBili  0.9  /  DBili  x   /  AST  74<H>  /  ALT  43  /  AlkPhos  80  04-16    PT/INR - ( 2019 04:35 )   PT: 16.0 sec;   INR: 1.39 ratio         PTT - ( 2019 04:35 )  PTT:43.7 sec  CARDIAC MARKERS ( 15 Apr 2019 11:31 )  x     / x     / 575 U/L / x     / 15.0 ng/mL      Urinalysis Basic - ( 15 Apr 2019 19:41 )    Color: Colorless / Appearance: Clear / S.013 / pH: x  Gluc: x / Ketone: Negative  / Bili: Negative / Urobili: Negative   Blood: x / Protein: Negative / Nitrite: Negative   Leuk Esterase: Negative / RBC: x / WBC x   Sq Epi: x / Non Sq Epi: x / Bacteria: x        RADIOLOGY & ADDITIONAL TESTS:    Imaging Personally Reviewed:  Consultant(s) Notes Reviewed:    Care Discussed with Consultants/Other Providers:

## 2019-04-16 NOTE — PROGRESS NOTE ADULT - ATTENDING COMMENTS
Patient is seen and examined with fellow, NP and the CCU house-staff. I agree with the history, physical and the assessment and plan.  awaiting CABG  c/w IABP

## 2019-04-17 ENCOUNTER — TRANSCRIPTION ENCOUNTER (OUTPATIENT)
Age: 74
End: 2019-04-17

## 2019-04-17 PROBLEM — Z00.00 ENCOUNTER FOR PREVENTIVE HEALTH EXAMINATION: Status: ACTIVE | Noted: 2019-04-17

## 2019-04-17 LAB
ALBUMIN SERPL ELPH-MCNC: 3.6 G/DL — SIGNIFICANT CHANGE UP (ref 3.3–5)
ALP SERPL-CCNC: 92 U/L — SIGNIFICANT CHANGE UP (ref 40–120)
ALT FLD-CCNC: 32 U/L — SIGNIFICANT CHANGE UP (ref 10–45)
ANION GAP SERPL CALC-SCNC: 11 MMOL/L — SIGNIFICANT CHANGE UP (ref 5–17)
APTT BLD: 66.3 SEC — HIGH (ref 27.5–36.3)
AST SERPL-CCNC: 39 U/L — SIGNIFICANT CHANGE UP (ref 10–40)
BASOPHILS # BLD AUTO: 0 K/UL — SIGNIFICANT CHANGE UP (ref 0–0.2)
BASOPHILS NFR BLD AUTO: 0.5 % — SIGNIFICANT CHANGE UP (ref 0–2)
BILIRUB SERPL-MCNC: 0.6 MG/DL — SIGNIFICANT CHANGE UP (ref 0.2–1.2)
BUN SERPL-MCNC: 20 MG/DL — SIGNIFICANT CHANGE UP (ref 7–23)
CALCIUM SERPL-MCNC: 9.2 MG/DL — SIGNIFICANT CHANGE UP (ref 8.4–10.5)
CHLORIDE SERPL-SCNC: 102 MMOL/L — SIGNIFICANT CHANGE UP (ref 96–108)
CO2 SERPL-SCNC: 23 MMOL/L — SIGNIFICANT CHANGE UP (ref 22–31)
CREAT SERPL-MCNC: 1.48 MG/DL — HIGH (ref 0.5–1.3)
EOSINOPHIL # BLD AUTO: 0.4 K/UL — SIGNIFICANT CHANGE UP (ref 0–0.5)
EOSINOPHIL NFR BLD AUTO: 4 % — SIGNIFICANT CHANGE UP (ref 0–6)
GLUCOSE SERPL-MCNC: 103 MG/DL — HIGH (ref 70–99)
HCT VFR BLD CALC: 41 % — SIGNIFICANT CHANGE UP (ref 39–50)
HGB BLD-MCNC: 13.7 G/DL — SIGNIFICANT CHANGE UP (ref 13–17)
INR BLD: 1.22 RATIO — HIGH (ref 0.88–1.16)
LYMPHOCYTES # BLD AUTO: 1.7 K/UL — SIGNIFICANT CHANGE UP (ref 1–3.3)
LYMPHOCYTES # BLD AUTO: 18.2 % — SIGNIFICANT CHANGE UP (ref 13–44)
MAGNESIUM SERPL-MCNC: 2.3 MG/DL — SIGNIFICANT CHANGE UP (ref 1.6–2.6)
MCHC RBC-ENTMCNC: 31.7 PG — SIGNIFICANT CHANGE UP (ref 27–34)
MCHC RBC-ENTMCNC: 33.5 GM/DL — SIGNIFICANT CHANGE UP (ref 32–36)
MCV RBC AUTO: 94.7 FL — SIGNIFICANT CHANGE UP (ref 80–100)
MONOCYTES # BLD AUTO: 0.9 K/UL — SIGNIFICANT CHANGE UP (ref 0–0.9)
MONOCYTES NFR BLD AUTO: 10.3 % — SIGNIFICANT CHANGE UP (ref 2–14)
NEUTROPHILS # BLD AUTO: 6.1 K/UL — SIGNIFICANT CHANGE UP (ref 1.8–7.4)
NEUTROPHILS NFR BLD AUTO: 67 % — SIGNIFICANT CHANGE UP (ref 43–77)
PA ADP PRP-ACNC: 155 PRU — LOW (ref 194–417)
PHOSPHATE SERPL-MCNC: 3 MG/DL — SIGNIFICANT CHANGE UP (ref 2.5–4.5)
PLATELET # BLD AUTO: 167 K/UL — SIGNIFICANT CHANGE UP (ref 150–400)
POTASSIUM SERPL-MCNC: 4.2 MMOL/L — SIGNIFICANT CHANGE UP (ref 3.5–5.3)
POTASSIUM SERPL-SCNC: 4.2 MMOL/L — SIGNIFICANT CHANGE UP (ref 3.5–5.3)
PROT SERPL-MCNC: 7 G/DL — SIGNIFICANT CHANGE UP (ref 6–8.3)
PROTHROM AB SERPL-ACNC: 14.1 SEC — HIGH (ref 10–12.9)
RBC # BLD: 4.33 M/UL — SIGNIFICANT CHANGE UP (ref 4.2–5.8)
RBC # FLD: 12 % — SIGNIFICANT CHANGE UP (ref 10.3–14.5)
SODIUM SERPL-SCNC: 136 MMOL/L — SIGNIFICANT CHANGE UP (ref 135–145)
WBC # BLD: 9.2 K/UL — SIGNIFICANT CHANGE UP (ref 3.8–10.5)
WBC # FLD AUTO: 9.2 K/UL — SIGNIFICANT CHANGE UP (ref 3.8–10.5)

## 2019-04-17 PROCEDURE — 71045 X-RAY EXAM CHEST 1 VIEW: CPT | Mod: 26

## 2019-04-17 PROCEDURE — 99232 SBSQ HOSP IP/OBS MODERATE 35: CPT | Mod: 24

## 2019-04-17 PROCEDURE — 99233 SBSQ HOSP IP/OBS HIGH 50: CPT | Mod: GC

## 2019-04-17 PROCEDURE — 93010 ELECTROCARDIOGRAM REPORT: CPT

## 2019-04-17 RX ORDER — CHLORHEXIDINE GLUCONATE 213 G/1000ML
1 SOLUTION TOPICAL ONCE
Qty: 0 | Refills: 0 | Status: DISCONTINUED | OUTPATIENT
Start: 2019-04-17 | End: 2019-04-17

## 2019-04-17 RX ORDER — CHLORHEXIDINE GLUCONATE 213 G/1000ML
1 SOLUTION TOPICAL ONCE
Qty: 0 | Refills: 0 | Status: COMPLETED | OUTPATIENT
Start: 2019-04-18 | End: 2019-04-18

## 2019-04-17 RX ORDER — HYDRALAZINE HCL 50 MG
25 TABLET ORAL EVERY 8 HOURS
Qty: 0 | Refills: 0 | Status: DISCONTINUED | OUTPATIENT
Start: 2019-04-17 | End: 2019-04-18

## 2019-04-17 RX ORDER — MEPERIDINE HYDROCHLORIDE 50 MG/ML
25 INJECTION INTRAMUSCULAR; INTRAVENOUS; SUBCUTANEOUS ONCE
Qty: 0 | Refills: 0 | Status: DISCONTINUED | OUTPATIENT
Start: 2019-04-18 | End: 2019-04-18

## 2019-04-17 RX ORDER — ASPIRIN/CALCIUM CARB/MAGNESIUM 324 MG
300 TABLET ORAL ONCE
Qty: 0 | Refills: 0 | Status: DISCONTINUED | OUTPATIENT
Start: 2019-04-18 | End: 2019-04-18

## 2019-04-17 RX ORDER — ASPIRIN/CALCIUM CARB/MAGNESIUM 324 MG
325 TABLET ORAL DAILY
Qty: 0 | Refills: 0 | Status: DISCONTINUED | OUTPATIENT
Start: 2019-04-18 | End: 2019-04-25

## 2019-04-17 RX ORDER — CHLORHEXIDINE GLUCONATE 213 G/1000ML
30 SOLUTION TOPICAL ONCE
Qty: 0 | Refills: 0 | Status: DISCONTINUED | OUTPATIENT
Start: 2019-04-17 | End: 2019-04-17

## 2019-04-17 RX ORDER — CEFUROXIME AXETIL 250 MG
1500 TABLET ORAL ONCE
Qty: 0 | Refills: 0 | Status: DISCONTINUED | OUTPATIENT
Start: 2019-04-17 | End: 2019-04-18

## 2019-04-17 RX ADMIN — CHLORHEXIDINE GLUCONATE 15 MILLILITER(S): 213 SOLUTION TOPICAL at 01:34

## 2019-04-17 RX ADMIN — HEPARIN SODIUM 1100 UNIT(S)/HR: 5000 INJECTION INTRAVENOUS; SUBCUTANEOUS at 06:35

## 2019-04-17 RX ADMIN — Medication 10 MILLIGRAM(S): at 14:40

## 2019-04-17 RX ADMIN — CHLORHEXIDINE GLUCONATE 1 APPLICATION(S): 213 SOLUTION TOPICAL at 06:35

## 2019-04-17 RX ADMIN — ISOSORBIDE DINITRATE 10 MILLIGRAM(S): 5 TABLET ORAL at 06:35

## 2019-04-17 RX ADMIN — ATORVASTATIN CALCIUM 80 MILLIGRAM(S): 80 TABLET, FILM COATED ORAL at 22:13

## 2019-04-17 RX ADMIN — Medication 10 MILLIGRAM(S): at 06:35

## 2019-04-17 RX ADMIN — ISOSORBIDE DINITRATE 10 MILLIGRAM(S): 5 TABLET ORAL at 14:40

## 2019-04-17 RX ADMIN — Medication 10 MILLIGRAM(S): at 22:13

## 2019-04-17 RX ADMIN — ISOSORBIDE DINITRATE 10 MILLIGRAM(S): 5 TABLET ORAL at 22:13

## 2019-04-17 NOTE — DIETITIAN INITIAL EVALUATION ADULT. - PHYSICAL APPEARANCE
Pt appears appropriately nourished; BMI 25.1 kg/m2 per dosing weight.  Pt reports weight stable - usual body weight 168-170 lbs.

## 2019-04-17 NOTE — DIETITIAN INITIAL EVALUATION ADULT. - PROBLEM SELECTOR PLAN 1
CP, w Ischemic changes on EKG (ST depression of lateral leads, T-wave inversion inferior leads), Uptrending troponin: 1179 -> 2116 -> 2681. Pt currently asymptomatic.   -Interventional Cardiology Team aware- no current plan for urgent cardiac cath today, pt currently scheduled for tmrw  -Cont heparin gtt/ Brilinta/ ASA daily  -No utility in trending additonal troponin as per cardiology   -Will start high dose Lipitor   -Will start Isordil with hold parameters for anginal pain  -F/U A1c, Lipid Panel   -ECHO to evaluate for structural heart dz   -Admit to telemetry  -Appreciate Cardiology reccs

## 2019-04-17 NOTE — PROGRESS NOTE ADULT - SUBJECTIVE AND OBJECTIVE BOX
Cardiac Surgery Pre-op Note:  CC: Patient is a 74y old  Male who presents with a chief complaint of NSTEMI (2019 06:49)      Referring Physician: Dr. Winters                                                                                           Surgeon: Dr. Valdez  Procedure:  CABG x3     Allergies    No Known Allergies    Intolerances      HPI:  74M PMH GERD, HTN BIBEMS, transferred from [facility] after presenting initially with chest pain. Pt states he's had midsternal/epigastric chest pain, rated 9/10 at worst, non-radiating, worst w rest, alleviated by ambulating, associated w nausea and gas. He has had these symptoms for months however pt symptoms worsening in frequency and thus he was brought to the hospital by his daughter whom he has been visiting since 2018 from [facility]. Pt found to have EKG, elevated trop and Pro-bnp (>6000), ASA, Brilinta, Hep loaded and transferred to [facility] for further management. (2019 10:24)      PAST MEDICAL & SURGICAL HISTORY:  Chronic GERD  HTN (hypertension)  History of gastrectomy      MEDICATIONS  (STANDING):  atorvastatin 80 milliGRAM(s) Oral at bedtime  cefuroxime  IVPB 1500 milliGRAM(s) IV Intermittent once  chlorhexidine 0.12% Liquid 30 milliLiter(s) Swish and Spit once  chlorhexidine 2% Cloths 1 Application(s) Topical daily  chlorhexidine 4% Liquid 1 Application(s) Topical <User Schedule>  chlorhexidine 4% Liquid 1 Application(s) Topical once  heparin  Infusion.  Unit(s)/Hr (9.5 mL/Hr) IV Continuous <Continuous>  hydrALAZINE 10 milliGRAM(s) Oral three times a day  influenza   Vaccine 0.5 milliLiter(s) IntraMuscular once  isosorbide   dinitrate Tablet (ISORDIL) 10 milliGRAM(s) Oral three times a day    MEDICATIONS  (PRN):  heparin  Injectable 4700 Unit(s) IV Push every 6 hours PRN For aPTT less than 40      Labs:                        13.7   9.2   )-----------( 167      ( 2019 05:57 )             41.0    136  |  102  |  20  ----------------------------<  103<H>  4.2   |  23  |  1.48<H>    AST  39  /  ALT  32  /  AlkPhos  92      PT/INR/PTT - ( 2019 05:57 )   PT: 14.1 sec;   INR: 1.22 ratio   PTT:66.3 sec    Blood Type: ABO Interpretation: O ( @ 17:52)  HGB A1C: Hemoglobin A1C, Whole Blood: 5.6 % (04-15 @ 22:40)  Pro-BNP: Serum Pro-Brain Natriuretic Peptide: 7352 pg/mL (04-15 @ 11:31)  Thyroid Panel: 04-15 @ 22:40/4.41/7.1/84  P2Y12: 155      MRSA/ MSSA PCR Result.: NotDetec ( @ 08:48)    Urinalysis Basic - ( 15 Apr 2019 19:41 )  Color: Colorless / Appearance: Clear / S.013 / pH: x  Gluc: x / Ketone: Negative  / Bili: Negative / Urobili: Negative   Blood: x / Protein: Negative / Nitrite: Negative   Leuk Esterase: Negative / RBC: x / WBC x   Sq Epi: x / Non Sq Epi: x / Bacteria: x        < from: Xray Chest 1 View AP/PA (19 @ 10:10) >  The heart is enlarged. The lungs are clear. Intra-aortic balloon pump is   1.5 cm below the aortic knob. No pneumothorax.      < from: 12 Lead ECG (19 @ 09:00) >  NORMAL SINUS RHYTHM  POSSIBLE LEFT ATRIAL ENLARGEMENT  RIGHT BUNDLE BRANCH BLOCK  LEFT ANTERIOR FASCICULAR BLOCK  *** BIFASCICULAR BLOCK ***  LEFT VENTRICULAR HYPERTROPHY  ANTEROLATERAL INFARCT , AGE UNDETERMINED  T WAVE ABNORMALITY, CONSIDER INFERIOR ISCHEMIA  ABNORMAL ECG      < from: VA Duplex Carotid, Bilat (19 @ 14:28) >  No hemodynamically significant stenosis seen within the bilateral   internal carotid arteries.      PFT's: completed       < from: TTE with Doppler (w/Cont) (04.15.19 @ 16:20) >  1. Severe global left ventricular systolic dysfunction with  regional variation. Endocardial visualization enhanced with  intravenous injection of Ultrasonic Enhancing Agent  (Definity). No left ventricular thrombus.  2. Severe reversible diastolic dysfunction (Stage III).  3. The right ventricle is not well visualized; grossly  decreased right ventricular systolic function. TV s' = 8.0  cm/sec.  4. Estimated right ventricular systolic pressure equals 27  mm Hg, assuming right atrial pressure equals 8 mm Hg,  consistent with normal pulmonary pressures.      < from: Cardiac Cath Lab - Adult (04.15.19 @ 13:34) >  CORONARY VESSELS: The coronary circulation is right dominant.  LM:   --  LM: Normal.  LAD:   --  Mid LAD: There was a 99 % stenosis.  CX:   --  Proximal circumflex: There was a 99 % stenosis.  RCA:   --  Proximal RCA:There was a 95 % stenosis.  --  Mid RCA: There was a 99 % stenosis.    PHYSICAL EXAM  Gen: WN/WD NAD  Neuro: AAOx3, nonfocal  Pulm: CTA B/L  CV: RRR, S1S2 +systolic murmur  Abd: Soft, NT, ND +BS  Ext: No edema, + peripheral pulses  +IABP      Pt has AICD/PPM [ ] Yes  [x ] No              Pre-op Beta Blocker ordered within 24 hrs of surgery (CABG ONLY)?  [  ] Yes  [X] No  If not, Why? Pt on IABP   Type & Cross  [X ] Yes  [ ] No  NPO after Midnight [x ] Yes  [ ] No  Pre-op ABX ordered, to be taped on chart:  [ x] Yes  [ ] No     Hibiclens/Peridex ordered [x ] Yes  [ ] No  Intraop on Hold: PRBCs, CXR, CHRISTINE [x ]   Consent obtained  [x ] Yes  [ ] No

## 2019-04-17 NOTE — DIETITIAN INITIAL EVALUATION ADULT. - OTHER INFO
Pt seen for initial nutrition assessment for ICU length of stay (CCU).  Pt denies nausea/vomiting, chewing/swallowing issues; endorses constipation at baseline - noted last BM 4/13/19 per flowsheets, though Pt reports last BM was 2 days ago.  No bowel regimen noted at this time.  Pt endorses poor appetite, however, per RN, Pt appears to be eating relatively well.  RD encouraged adequate energy and protein intake in context of planned CABG; Pt amenable to receiving po supplement; RD encouraged sipping po supplement, especially when po intake from food is low, discussed increased needs for healing s/p CABG. Pt seen for initial nutrition assessment for ICU length of stay (CCU).  Pt denies nausea/vomiting, chewing/swallowing issues; endorses constipation at baseline - noted last BM 4/13/19 per flowsheets, though Pt reports last BM was 2 days ago.  No bowel regimen noted at this time.  Pt endorses poor appetite, however, per RN, Pt appears to be eating relatively well.  RD encouraged adequate energy and protein intake in context of planned CABG; Pt amenable to receiving po supplement; RD encouraged sipping po supplement, especially when po intake from food is low, discussed increased needs for healing s/p CABG. Pt declined providing food preferences at this time ("I don't feel like eating anything"), but made aware RD remains available PRN.  RN has not observed Pt receiving food from family thus far.

## 2019-04-17 NOTE — DIETITIAN INITIAL EVALUATION ADULT. - ENERGY NEEDS
ht: 68.9 inches, weight: 169.7 lbs, BMI: 25.1 kg/m2, IBW: 160 lbs (+/- 10%), %IBW: 106%  Edema: none noted  Skin per nursing documentation: no pressure injuries noted  GI: last BM 4/13/19 per flowsheets, 4/15/19 per Pt  Per chart: 75 y/o Panamanian speaking male PMH GERD, HTN transferred from OSH with chest pain found to have NSTEMI, currently managed medically, pending ischemic evaluation with cardiac catheretization.    CTS following - CABG x3 4/18.

## 2019-04-17 NOTE — PROGRESS NOTE ADULT - SUBJECTIVE AND OBJECTIVE BOX
Patient is a 74y old  Male who presents with a chief complaint of NSTEMI (2019 01:08)        SUBJECTIVE / OVERNIGHT EVENTS:      MEDICATIONS  (STANDING):  atorvastatin 80 milliGRAM(s) Oral at bedtime  ceFAZolin   IVPB 2000 milliGRAM(s) IV Intermittent once  chlorhexidine 0.12% Liquid 15 milliLiter(s) Swish and Spit two times a day  chlorhexidine 2% Cloths 1 Application(s) Topical daily  chlorhexidine 4% Liquid 1 Application(s) Topical <User Schedule>  heparin  Infusion.  Unit(s)/Hr (9.5 mL/Hr) IV Continuous <Continuous>  hydrALAZINE 10 milliGRAM(s) Oral three times a day  influenza   Vaccine 0.5 milliLiter(s) IntraMuscular once  isosorbide   dinitrate Tablet (ISORDIL) 10 milliGRAM(s) Oral three times a day    MEDICATIONS  (PRN):  heparin  Injectable 4700 Unit(s) IV Push every 6 hours PRN For aPTT less than 40        CAPILLARY BLOOD GLUCOSE        I&O's Summary    15 Apr 2019 07:  -  2019 07:00  --------------------------------------------------------  IN: 688.5 mL / OUT: 1250 mL / NET: -561.5 mL    2019 07:  -  2019 06:50  --------------------------------------------------------  IN: 638 mL / OUT: 570 mL / NET: 68 mL        PHYSICAL EXAM  GENERAL: NAD, well-developed  HEAD:  Atraumatic, Normocephalic  EYES: EOMI, PERRLA, conjunctiva and sclera clear  NECK: Supple, No JVD  CHEST/LUNG: Clear to auscultation bilaterally; No wheeze  HEART: Regular rate and rhythm; No murmurs, rubs, or gallops  ABDOMEN: Soft, Nontender, Nondistended; Bowel sounds present  EXTREMITIES:  2+ Peripheral Pulses, No clubbing, cyanosis, or edema  PSYCH: AAOx3  SKIN: No rashes or lesions    LABS:                        13.7   9.2   )-----------( 167      ( 2019 05:57 )             41.0         136  |  102  |  20  ----------------------------<  103<H>  4.2   |  23  |  1.48<H>    Ca    9.2      2019 05:57  Phos  3.0       Mg     2.3         TPro  7.0  /  Alb  3.6  /  TBili  0.6  /  DBili  x   /  AST  39  /  ALT  32  /  AlkPhos  92      PT/INR - ( 2019 05:57 )   PT: 14.1 sec;   INR: 1.22 ratio         PTT - ( 2019 05:57 )  PTT:66.3 sec  CARDIAC MARKERS ( 2019 04:35 )  x     / x     / 307 U/L / x     / 6.4 ng/mL  CARDIAC MARKERS ( 15 Apr 2019 11:31 )  x     / x     / 575 U/L / x     / 15.0 ng/mL      Urinalysis Basic - ( 15 Apr 2019 19:41 )    Color: Colorless / Appearance: Clear / S.013 / pH: x  Gluc: x / Ketone: Negative  / Bili: Negative / Urobili: Negative   Blood: x / Protein: Negative / Nitrite: Negative   Leuk Esterase: Negative / RBC: x / WBC x   Sq Epi: x / Non Sq Epi: x / Bacteria: x        RADIOLOGY & ADDITIONAL TESTS:    Imaging Personally Reviewed:  Consultant(s) Notes Reviewed:    Care Discussed with Consultants/Other Providers: Patient is a 74y old  Male who presents with a chief complaint of NSTEMI (2019 01:08)      SUBJECTIVE / OVERNIGHT EVENTS: No acute overnight events. Pt without any fevers, chills, CP or SoB since yesterday.      MEDICATIONS  (STANDING):  atorvastatin 80 milliGRAM(s) Oral at bedtime  ceFAZolin   IVPB 2000 milliGRAM(s) IV Intermittent once  chlorhexidine 0.12% Liquid 15 milliLiter(s) Swish and Spit two times a day  chlorhexidine 2% Cloths 1 Application(s) Topical daily  chlorhexidine 4% Liquid 1 Application(s) Topical <User Schedule>  heparin  Infusion.  Unit(s)/Hr (9.5 mL/Hr) IV Continuous <Continuous>  hydrALAZINE 10 milliGRAM(s) Oral three times a day  influenza   Vaccine 0.5 milliLiter(s) IntraMuscular once  isosorbide   dinitrate Tablet (ISORDIL) 10 milliGRAM(s) Oral three times a day    MEDICATIONS  (PRN):  heparin  Injectable 4700 Unit(s) IV Push every 6 hours PRN For aPTT less than 40        CAPILLARY BLOOD GLUCOSE        I&O's Summary    15 Apr 2019 07:  -  2019 07:00  --------------------------------------------------------  IN: 688.5 mL / OUT: 1250 mL / NET: -561.5 mL    2019 07:  -  2019 06:50  --------------------------------------------------------  IN: 638 mL / OUT: 570 mL / NET: 68 mL        PHYSICAL EXAM  GENERAL: NAD, well-developed  HEAD:  Atraumatic, Normocephalic  EYES: EOMI, PERRLA, conjunctiva and sclera clear  NECK: Supple, No JVD  CHEST/LUNG: Clear to auscultation bilaterally; No wheeze  HEART: Regular rate and rhythm; diastolic murmur?  ABDOMEN: Soft, Nontender, Nondistended; Bowel sounds present  EXTREMITIES:  2+ Peripheral Pulses, No clubbing, cyanosis, or edema  PSYCH: AAOx3  SKIN: No rashes or lesions    LABS:                        13.7   9.2   )-----------( 167      ( 2019 05:57 )             41.0         136  |  102  |  20  ----------------------------<  103<H>  4.2   |  23  |  1.48<H>    Ca    9.2      2019 05:57  Phos  3.0       Mg     2.3         TPro  7.0  /  Alb  3.6  /  TBili  0.6  /  DBili  x   /  AST  39  /  ALT  32  /  AlkPhos  92      PT/INR - ( 2019 05:57 )   PT: 14.1 sec;   INR: 1.22 ratio         PTT - ( 2019 05:57 )  PTT:66.3 sec  CARDIAC MARKERS ( 2019 04:35 )  x     / x     / 307 U/L / x     / 6.4 ng/mL  CARDIAC MARKERS ( 15 Apr 2019 11:31 )  x     / x     / 575 U/L / x     / 15.0 ng/mL      Urinalysis Basic - ( 15 Apr 2019 19:41 )    Color: Colorless / Appearance: Clear / S.013 / pH: x  Gluc: x / Ketone: Negative  / Bili: Negative / Urobili: Negative   Blood: x / Protein: Negative / Nitrite: Negative   Leuk Esterase: Negative / RBC: x / WBC x   Sq Epi: x / Non Sq Epi: x / Bacteria: x        RADIOLOGY & ADDITIONAL TESTS:    Imaging Personally Reviewed:  Consultant(s) Notes Reviewed:    Care Discussed with Consultants/Other Providers:

## 2019-04-17 NOTE — DIETITIAN INITIAL EVALUATION ADULT. - NS AS NUTRI INTERV ED CONTENT
RD encouraged adequate intake in context of planned CABG, protein intake, po supplements when intake from meals low.  Also provided Heart Healthy Nutrition Low Sodium handout (left at bedside, Pt's family speaks english per report) Pt made aware RD remains available PRN.

## 2019-04-17 NOTE — PROGRESS NOTE ADULT - SUBJECTIVE AND OBJECTIVE BOX
====================  CCU MIDNIGHT ROUNDS  ====================    NEYMAR GARCIA  [id number]  Patient is a 74y old  Male who presents with a chief complaint of NSTEMI (17 Apr 2019 11:13)    ====================  SUMMARY:  ====================        ====================  NEW EVENTS:  ====================        ====================  VITALS (Last 12 hrs):  ====================    T(C): 37.1 (04-17-19 @ 20:00), Max: 37.3 (04-17-19 @ 16:00)  T(F): 98.8 (04-17-19 @ 20:00), Max: 99.2 (04-17-19 @ 16:00)  HR: 72 (04-17-19 @ 20:00) (62 - 74)  BP: --  BP(mean): --  ABP: --  ABP(mean): --  RR: 30 (04-17-19 @ 20:00) (18 - 30)  SpO2: 98% (04-17-19 @ 20:00) (97% - 100%)  Wt(kg): --  CVP(mm Hg): --  CVP(cm H2O): --  CO: --  CI: --  PA: --  PA(mean): --  PCWP: --  SVR: --  PVR: --    I&O's Summary    16 Apr 2019 07:01  -  17 Apr 2019 07:00  --------------------------------------------------------  IN: 649 mL / OUT: 570 mL / NET: 79 mL    17 Apr 2019 07:01  -  17 Apr 2019 21:19  --------------------------------------------------------  IN: 643 mL / OUT: 1100 mL / NET: -457 mL            PHYSICAL EXAM:  GENERAL: NAD, well-groomed, well-developed  HEAD: Atraumatic, Normocephalic  EYES: EOMI, PERRLA, conjunctiva and sclera clear  ENMT: No tonsillar erythema, exudates, or enlargement; Moist mucous membranes  NECK: Supple, No JVD, Normal Thyroid  NERVOUS SYSTEM: Alert & Oriented X3, Good concentration; Motor Strength 5/5 B/L upper and lower extremities  CHEST/LUNG: Clear to auscultation bilaterally; No rales, rhonchi, wheezing, or rubs  HEART: Regular rate and rhythm; S1 and S2; No murmurs, rubs, or gallops  ABDOMEN: Soft, Nontender, Nondistended: Bowel sounds present  EXTREMITIES: 2+ Peripheral Pulses, No clubbing, cyanosis, or edema  LYMPH: No lymphadenopathy noted  SKIN: No rashes or lesions    ====================  NEW LABS:  ====================                          13.7   9.2   )-----------( 167      ( 17 Apr 2019 05:57 )             41.0     04-17    136  |  102  |  20  ----------------------------<  103<H>  4.2   |  23  |  1.48<H>    Ca    9.2      17 Apr 2019 05:57  Phos  3.0     04-17  Mg     2.3     04-17    TPro  7.0  /  Alb  3.6  /  TBili  0.6  /  DBili  x   /  AST  39  /  ALT  32  /  AlkPhos  92  04-17    PT/INR - ( 17 Apr 2019 05:57 )   PT: 14.1 sec;   INR: 1.22 ratio         PTT - ( 17 Apr 2019 05:57 )  PTT:66.3 sec            ====================  A/P:  ==================== ====================  CCU MIDNIGHT ROUNDS  ====================    NEYMAR GARCIA  [id number]  Patient is a 74y old  Male who presents with a chief complaint of NSTEMI (17 Apr 2019 11:13)    ====================  SUMMARY:  ====================    74M with PMH of GERD and HTN presents to the ED with NSTEMI s/p cath (99% stenosis in LAD, LCx, and OM) showing multivessel disease pending evaluation by CTS.     ====================  NEW EVENTS:  ====================    Holding P2Y12. Will check P2Y12 level at 5AM. Remains on heparin gtt. Hydralazine increased to 25 Q8H due to augmented diastolic BPs in 150s.     ====================  VITALS (Last 12 hrs):  ====================    T(C): 37.1 (04-17-19 @ 20:00), Max: 37.3 (04-17-19 @ 16:00)  T(F): 98.8 (04-17-19 @ 20:00), Max: 99.2 (04-17-19 @ 16:00)  HR: 72 (04-17-19 @ 20:00) (62 - 74)  BP: --  BP(mean): --  ABP: --  ABP(mean): --  RR: 30 (04-17-19 @ 20:00) (18 - 30)  SpO2: 98% (04-17-19 @ 20:00) (97% - 100%)  Wt(kg): --  CVP(mm Hg): --  CVP(cm H2O): --  CO: --  CI: --  PA: --  PA(mean): --  PCWP: --  SVR: --  PVR: --    I&O's Summary    16 Apr 2019 07:01  -  17 Apr 2019 07:00  --------------------------------------------------------  IN: 649 mL / OUT: 570 mL / NET: 79 mL    17 Apr 2019 07:01  -  17 Apr 2019 21:19  --------------------------------------------------------  IN: 643 mL / OUT: 1100 mL / NET: -457 mL    ====================  NEW LABS:  ====================                          13.7   9.2   )-----------( 167      ( 17 Apr 2019 05:57 )             41.0     04-17    136  |  102  |  20  ----------------------------<  103<H>  4.2   |  23  |  1.48<H>    Ca    9.2      17 Apr 2019 05:57  Phos  3.0     04-17  Mg     2.3     04-17    TPro  7.0  /  Alb  3.6  /  TBili  0.6  /  DBili  x   /  AST  39  /  ALT  32  /  AlkPhos  92  04-17    PT/INR - ( 17 Apr 2019 05:57 )   PT: 14.1 sec;   INR: 1.22 ratio         PTT - ( 17 Apr 2019 05:57 )  PTT:66.3 sec            ====================  A/P:  ====================  74M with PMH of GERD and HTN presents to the ED with NSTEMI s/p cath (99% stenosis in LAD, LCx, and OM) showing multivessel disease pending evaluation by CTS.     #NSTEMI  - Pending CT surgery  - 5AM P2Y12 level goal > 170  - Continue heparin gtt  - IABP augmented diastolic BP elevated in 150s hydralazine increased to 25 Q8H (goal 100-120)

## 2019-04-17 NOTE — PROGRESS NOTE ADULT - ASSESSMENT
75 y/o Malawian speaking male PMH GERD, HTN transferred from OSH with chest pain found to have NSTEMI, currently managed medically, pending ischemic evaluation with cardiac catheretization.

## 2019-04-17 NOTE — DIETITIAN INITIAL EVALUATION ADULT. - ORAL INTAKE PTA
Pt reports good appetite/intake PTA; generally 2 meals per day - cultural foods including rice, beans, bread, milk, chicken, meat, fish. Pt reports good appetite/intake PTA; generally 2 meals per day - cultural foods including rice, beans, bread, milk, chicken, meat, fish.  Pt confirms NKFA; taking multivitamin supplement PTA.

## 2019-04-18 ENCOUNTER — APPOINTMENT (OUTPATIENT)
Age: 74
End: 2019-04-18

## 2019-04-18 LAB
ALBUMIN SERPL ELPH-MCNC: 2.1 G/DL — LOW (ref 3.3–5)
ALBUMIN SERPL ELPH-MCNC: 3.2 G/DL — LOW (ref 3.3–5)
ALP SERPL-CCNC: 45 U/L — SIGNIFICANT CHANGE UP (ref 40–120)
ALP SERPL-CCNC: 96 U/L — SIGNIFICANT CHANGE UP (ref 40–120)
ALT FLD-CCNC: 16 U/L — SIGNIFICANT CHANGE UP (ref 10–45)
ALT FLD-CCNC: 25 U/L — SIGNIFICANT CHANGE UP (ref 10–45)
ANION GAP SERPL CALC-SCNC: 10 MMOL/L — SIGNIFICANT CHANGE UP (ref 5–17)
ANION GAP SERPL CALC-SCNC: 14 MMOL/L — SIGNIFICANT CHANGE UP (ref 5–17)
APTT BLD: 33.3 SEC — SIGNIFICANT CHANGE UP (ref 27.5–36.3)
APTT BLD: 71.1 SEC — HIGH (ref 27.5–36.3)
AST SERPL-CCNC: 28 U/L — SIGNIFICANT CHANGE UP (ref 10–40)
AST SERPL-CCNC: 33 U/L — SIGNIFICANT CHANGE UP (ref 10–40)
BASE EXCESS BLDV CALC-SCNC: -0.4 MMOL/L — SIGNIFICANT CHANGE UP (ref -2–2)
BASE EXCESS BLDV CALC-SCNC: -2 MMOL/L — SIGNIFICANT CHANGE UP (ref -2–2)
BASE EXCESS BLDV CALC-SCNC: -3 MMOL/L — LOW (ref -2–2)
BASE EXCESS BLDV CALC-SCNC: -5 MMOL/L — LOW (ref -2–2)
BASE EXCESS BLDV CALC-SCNC: 0.5 MMOL/L — SIGNIFICANT CHANGE UP (ref -2–2)
BASOPHILS # BLD AUTO: 0 K/UL — SIGNIFICANT CHANGE UP (ref 0–0.2)
BASOPHILS # BLD AUTO: 0 K/UL — SIGNIFICANT CHANGE UP (ref 0–0.2)
BASOPHILS NFR BLD AUTO: 0.1 % — SIGNIFICANT CHANGE UP (ref 0–2)
BASOPHILS NFR BLD AUTO: 0.5 % — SIGNIFICANT CHANGE UP (ref 0–2)
BILIRUB SERPL-MCNC: 0.6 MG/DL — SIGNIFICANT CHANGE UP (ref 0.2–1.2)
BILIRUB SERPL-MCNC: 1 MG/DL — SIGNIFICANT CHANGE UP (ref 0.2–1.2)
BLOOD GAS VENOUS - CREATININE: SIGNIFICANT CHANGE UP MG/DL (ref 0.5–1.3)
BLOOD GAS VENOUS - CREATININE: SIGNIFICANT CHANGE UP MG/DL (ref 0.5–1.3)
BUN SERPL-MCNC: 15 MG/DL — SIGNIFICANT CHANGE UP (ref 7–23)
BUN SERPL-MCNC: 18 MG/DL — SIGNIFICANT CHANGE UP (ref 7–23)
CA-I SERPL-SCNC: 0.88 MMOL/L — LOW (ref 1.12–1.3)
CA-I SERPL-SCNC: 0.93 MMOL/L — LOW (ref 1.12–1.3)
CA-I SERPL-SCNC: 0.95 MMOL/L — LOW (ref 1.12–1.3)
CALCIUM SERPL-MCNC: 6.8 MG/DL — LOW (ref 8.4–10.5)
CALCIUM SERPL-MCNC: 8.8 MG/DL — SIGNIFICANT CHANGE UP (ref 8.4–10.5)
CHLORIDE BLDV-SCNC: SIGNIFICANT CHANGE UP MMOL/L (ref 96–108)
CHLORIDE SERPL-SCNC: 103 MMOL/L — SIGNIFICANT CHANGE UP (ref 96–108)
CHLORIDE SERPL-SCNC: 105 MMOL/L — SIGNIFICANT CHANGE UP (ref 96–108)
CK MB BLD-MCNC: 8.1 % — HIGH (ref 0–3.5)
CK MB CFR SERPL CALC: 16.1 NG/ML — HIGH (ref 0–6.7)
CK SERPL-CCNC: 199 U/L — SIGNIFICANT CHANGE UP (ref 30–200)
CO2 BLDV-SCNC: 23 MMOL/L — SIGNIFICANT CHANGE UP (ref 22–30)
CO2 BLDV-SCNC: 23 MMOL/L — SIGNIFICANT CHANGE UP (ref 22–30)
CO2 SERPL-SCNC: 21 MMOL/L — LOW (ref 22–31)
CO2 SERPL-SCNC: 21 MMOL/L — LOW (ref 22–31)
CREAT SERPL-MCNC: 1.02 MG/DL — SIGNIFICANT CHANGE UP (ref 0.5–1.3)
CREAT SERPL-MCNC: 1.33 MG/DL — HIGH (ref 0.5–1.3)
EOSINOPHIL # BLD AUTO: 0 K/UL — SIGNIFICANT CHANGE UP (ref 0–0.5)
EOSINOPHIL # BLD AUTO: 0.6 K/UL — HIGH (ref 0–0.5)
EOSINOPHIL NFR BLD AUTO: 0 % — SIGNIFICANT CHANGE UP (ref 0–6)
EOSINOPHIL NFR BLD AUTO: 6.6 % — HIGH (ref 0–6)
FIBRINOGEN PPP-MCNC: 376 MG/DL — SIGNIFICANT CHANGE UP (ref 350–510)
GAS PNL BLDA: SIGNIFICANT CHANGE UP
GAS PNL BLDV: 134 MMOL/L — LOW (ref 136–145)
GAS PNL BLDV: 135 MMOL/L — LOW (ref 136–145)
GAS PNL BLDV: 135 MMOL/L — LOW (ref 136–145)
GAS PNL BLDV: SIGNIFICANT CHANGE UP
GLUCOSE BLDC GLUCOMTR-MCNC: 167 MG/DL — HIGH (ref 70–99)
GLUCOSE BLDC GLUCOMTR-MCNC: 187 MG/DL — HIGH (ref 70–99)
GLUCOSE BLDC GLUCOMTR-MCNC: 208 MG/DL — HIGH (ref 70–99)
GLUCOSE BLDV-MCNC: 102 MG/DL — HIGH (ref 70–99)
GLUCOSE BLDV-MCNC: 112 MG/DL — HIGH (ref 70–99)
GLUCOSE BLDV-MCNC: 120 MG/DL — HIGH (ref 70–99)
GLUCOSE SERPL-MCNC: 103 MG/DL — HIGH (ref 70–99)
GLUCOSE SERPL-MCNC: 185 MG/DL — HIGH (ref 70–99)
HCO3 BLDV-SCNC: 21 MMOL/L — SIGNIFICANT CHANGE UP (ref 21–29)
HCO3 BLDV-SCNC: 22 MMOL/L — SIGNIFICANT CHANGE UP (ref 21–29)
HCO3 BLDV-SCNC: 22 MMOL/L — SIGNIFICANT CHANGE UP (ref 21–29)
HCO3 BLDV-SCNC: 24 MMOL/L — SIGNIFICANT CHANGE UP (ref 21–29)
HCO3 BLDV-SCNC: 24 MMOL/L — SIGNIFICANT CHANGE UP (ref 21–29)
HCT VFR BLD CALC: 30 % — LOW (ref 39–50)
HCT VFR BLD CALC: 39.4 % — SIGNIFICANT CHANGE UP (ref 39–50)
HCT VFR BLDA CALC: 22 % — CRITICAL LOW (ref 39–50)
HCT VFR BLDA CALC: 22 % — CRITICAL LOW (ref 39–50)
HCT VFR BLDA CALC: 27 % — LOW (ref 39–50)
HGB BLD CALC-MCNC: 6.9 G/DL — CRITICAL LOW (ref 13–17)
HGB BLD CALC-MCNC: 7.2 G/DL — LOW (ref 13–17)
HGB BLD CALC-MCNC: 8.6 G/DL — LOW (ref 13–17)
HGB BLD-MCNC: 10.6 G/DL — LOW (ref 13–17)
HGB BLD-MCNC: 13.5 G/DL — SIGNIFICANT CHANGE UP (ref 13–17)
HOROWITZ INDEX BLDV+IHG-RTO: 0 — SIGNIFICANT CHANGE UP
HOROWITZ INDEX BLDV+IHG-RTO: 100 — SIGNIFICANT CHANGE UP
HOROWITZ INDEX BLDV+IHG-RTO: 50 — SIGNIFICANT CHANGE UP
INR BLD: 1.24 RATIO — HIGH (ref 0.88–1.16)
INR BLD: 1.71 RATIO — HIGH (ref 0.88–1.16)
LACTATE BLDV-MCNC: 0.9 MMOL/L — SIGNIFICANT CHANGE UP (ref 0.7–2)
LACTATE BLDV-MCNC: 1.1 MMOL/L — SIGNIFICANT CHANGE UP (ref 0.7–2)
LACTATE BLDV-MCNC: 1.2 MMOL/L — SIGNIFICANT CHANGE UP (ref 0.7–2)
LYMPHOCYTES # BLD AUTO: 1.8 K/UL — SIGNIFICANT CHANGE UP (ref 1–3.3)
LYMPHOCYTES # BLD AUTO: 13.9 % — SIGNIFICANT CHANGE UP (ref 13–44)
LYMPHOCYTES # BLD AUTO: 2.6 K/UL — SIGNIFICANT CHANGE UP (ref 1–3.3)
LYMPHOCYTES # BLD AUTO: 20.1 % — SIGNIFICANT CHANGE UP (ref 13–44)
MAGNESIUM SERPL-MCNC: 2.2 MG/DL — SIGNIFICANT CHANGE UP (ref 1.6–2.6)
MAGNESIUM SERPL-MCNC: 2.9 MG/DL — HIGH (ref 1.6–2.6)
MCHC RBC-ENTMCNC: 32.7 PG — SIGNIFICANT CHANGE UP (ref 27–34)
MCHC RBC-ENTMCNC: 33.8 PG — SIGNIFICANT CHANGE UP (ref 27–34)
MCHC RBC-ENTMCNC: 34.2 GM/DL — SIGNIFICANT CHANGE UP (ref 32–36)
MCHC RBC-ENTMCNC: 35.2 GM/DL — SIGNIFICANT CHANGE UP (ref 32–36)
MCV RBC AUTO: 95.5 FL — SIGNIFICANT CHANGE UP (ref 80–100)
MCV RBC AUTO: 95.9 FL — SIGNIFICANT CHANGE UP (ref 80–100)
MONOCYTES # BLD AUTO: 0.9 K/UL — SIGNIFICANT CHANGE UP (ref 0–0.9)
MONOCYTES # BLD AUTO: 1.1 K/UL — HIGH (ref 0–0.9)
MONOCYTES NFR BLD AUTO: 10.5 % — SIGNIFICANT CHANGE UP (ref 2–14)
MONOCYTES NFR BLD AUTO: 6 % — SIGNIFICANT CHANGE UP (ref 2–14)
NEUTROPHILS # BLD AUTO: 15 K/UL — HIGH (ref 1.8–7.4)
NEUTROPHILS # BLD AUTO: 5.4 K/UL — SIGNIFICANT CHANGE UP (ref 1.8–7.4)
NEUTROPHILS NFR BLD AUTO: 62.3 % — SIGNIFICANT CHANGE UP (ref 43–77)
NEUTROPHILS NFR BLD AUTO: 80 % — HIGH (ref 43–77)
PA ADP PRP-ACNC: 161 PRU — LOW (ref 194–417)
PCO2 BLDV: 36 MMHG — SIGNIFICANT CHANGE UP (ref 35–50)
PCO2 BLDV: 38 MMHG — SIGNIFICANT CHANGE UP (ref 35–50)
PCO2 BLDV: 40 MMHG — SIGNIFICANT CHANGE UP (ref 35–50)
PCO2 BLDV: 40 MMHG — SIGNIFICANT CHANGE UP (ref 35–50)
PCO2 BLDV: 47 MMHG — SIGNIFICANT CHANGE UP (ref 35–50)
PH BLDV: 7.28 — LOW (ref 7.35–7.45)
PH BLDV: 7.36 — SIGNIFICANT CHANGE UP (ref 7.35–7.45)
PH BLDV: 7.37 — SIGNIFICANT CHANGE UP (ref 7.35–7.45)
PH BLDV: 7.41 — SIGNIFICANT CHANGE UP (ref 7.35–7.45)
PH BLDV: 7.44 — SIGNIFICANT CHANGE UP (ref 7.35–7.45)
PHOSPHATE SERPL-MCNC: 2.9 MG/DL — SIGNIFICANT CHANGE UP (ref 2.5–4.5)
PHOSPHATE SERPL-MCNC: 3.2 MG/DL — SIGNIFICANT CHANGE UP (ref 2.5–4.5)
PLATELET # BLD AUTO: 112 K/UL — LOW (ref 150–400)
PLATELET # BLD AUTO: 145 K/UL — LOW (ref 150–400)
PO2 BLDV: 197 MMHG — HIGH (ref 25–45)
PO2 BLDV: 43 MMHG — SIGNIFICANT CHANGE UP (ref 25–45)
PO2 BLDV: 48 MMHG — HIGH (ref 25–45)
PO2 BLDV: 51 MMHG — HIGH (ref 25–45)
PO2 BLDV: 65 MMHG — HIGH (ref 25–45)
POTASSIUM BLDV-SCNC: 4.5 MMOL/L — SIGNIFICANT CHANGE UP (ref 3.5–5)
POTASSIUM BLDV-SCNC: 4.7 MMOL/L — SIGNIFICANT CHANGE UP (ref 3.5–5)
POTASSIUM BLDV-SCNC: 5.1 MMOL/L — HIGH (ref 3.5–5)
POTASSIUM SERPL-MCNC: 3.8 MMOL/L — SIGNIFICANT CHANGE UP (ref 3.5–5.3)
POTASSIUM SERPL-MCNC: 4.2 MMOL/L — SIGNIFICANT CHANGE UP (ref 3.5–5.3)
POTASSIUM SERPL-SCNC: 3.8 MMOL/L — SIGNIFICANT CHANGE UP (ref 3.5–5.3)
POTASSIUM SERPL-SCNC: 4.2 MMOL/L — SIGNIFICANT CHANGE UP (ref 3.5–5.3)
PROT SERPL-MCNC: 3.5 G/DL — LOW (ref 6–8.3)
PROT SERPL-MCNC: 6.7 G/DL — SIGNIFICANT CHANGE UP (ref 6–8.3)
PROTHROM AB SERPL-ACNC: 14.2 SEC — HIGH (ref 10–12.9)
PROTHROM AB SERPL-ACNC: 20 SEC — HIGH (ref 10–12.9)
RBC # BLD: 3.13 M/UL — LOW (ref 4.2–5.8)
RBC # BLD: 4.12 M/UL — LOW (ref 4.2–5.8)
RBC # FLD: 12.4 % — SIGNIFICANT CHANGE UP (ref 10.3–14.5)
RBC # FLD: 12.5 % — SIGNIFICANT CHANGE UP (ref 10.3–14.5)
SAO2 % BLDV: 100 % — HIGH (ref 67–88)
SAO2 % BLDV: 78 % — SIGNIFICANT CHANGE UP (ref 67–88)
SAO2 % BLDV: 80 % — SIGNIFICANT CHANGE UP (ref 67–88)
SAO2 % BLDV: 81 % — SIGNIFICANT CHANGE UP (ref 67–88)
SAO2 % BLDV: 92 % — HIGH (ref 67–88)
SODIUM SERPL-SCNC: 134 MMOL/L — LOW (ref 135–145)
SODIUM SERPL-SCNC: 140 MMOL/L — SIGNIFICANT CHANGE UP (ref 135–145)
TROPONIN T, HIGH SENSITIVITY RESULT: 3791 NG/L — HIGH (ref 0–51)
WBC # BLD: 18.7 K/UL — HIGH (ref 3.8–10.5)
WBC # BLD: 8.7 K/UL — SIGNIFICANT CHANGE UP (ref 3.8–10.5)
WBC # FLD AUTO: 18.7 K/UL — HIGH (ref 3.8–10.5)
WBC # FLD AUTO: 8.7 K/UL — SIGNIFICANT CHANGE UP (ref 3.8–10.5)

## 2019-04-18 PROCEDURE — 33508 ENDOSCOPIC VEIN HARVEST: CPT

## 2019-04-18 PROCEDURE — 99291 CRITICAL CARE FIRST HOUR: CPT

## 2019-04-18 PROCEDURE — 33519 CABG ARTERY-VEIN THREE: CPT

## 2019-04-18 PROCEDURE — 93010 ELECTROCARDIOGRAM REPORT: CPT

## 2019-04-18 PROCEDURE — 33533 CABG ARTERIAL SINGLE: CPT

## 2019-04-18 PROCEDURE — 71045 X-RAY EXAM CHEST 1 VIEW: CPT | Mod: 26

## 2019-04-18 RX ORDER — ACETAMINOPHEN 500 MG
650 TABLET ORAL ONCE
Qty: 0 | Refills: 0 | Status: COMPLETED | OUTPATIENT
Start: 2019-04-18 | End: 2019-04-18

## 2019-04-18 RX ORDER — MEPERIDINE HYDROCHLORIDE 50 MG/ML
25 INJECTION INTRAMUSCULAR; INTRAVENOUS; SUBCUTANEOUS ONCE
Qty: 0 | Refills: 0 | Status: DISCONTINUED | OUTPATIENT
Start: 2019-04-18 | End: 2019-04-18

## 2019-04-18 RX ORDER — POTASSIUM CHLORIDE 20 MEQ
10 PACKET (EA) ORAL
Qty: 0 | Refills: 0 | Status: DISCONTINUED | OUTPATIENT
Start: 2019-04-18 | End: 2019-04-18

## 2019-04-18 RX ORDER — SODIUM CHLORIDE 9 MG/ML
1000 INJECTION INTRAMUSCULAR; INTRAVENOUS; SUBCUTANEOUS
Qty: 0 | Refills: 0 | Status: DISCONTINUED | OUTPATIENT
Start: 2019-04-18 | End: 2019-04-18

## 2019-04-18 RX ORDER — METOCLOPRAMIDE HCL 10 MG
10 TABLET ORAL EVERY 8 HOURS
Qty: 0 | Refills: 0 | Status: COMPLETED | OUTPATIENT
Start: 2019-04-18 | End: 2019-04-20

## 2019-04-18 RX ORDER — ALBUMIN HUMAN 25 %
250 VIAL (ML) INTRAVENOUS ONCE
Qty: 0 | Refills: 0 | Status: COMPLETED | OUTPATIENT
Start: 2019-04-18 | End: 2019-04-18

## 2019-04-18 RX ORDER — CHLORHEXIDINE GLUCONATE 213 G/1000ML
5 SOLUTION TOPICAL EVERY 4 HOURS
Qty: 0 | Refills: 0 | Status: DISCONTINUED | OUTPATIENT
Start: 2019-04-18 | End: 2019-04-19

## 2019-04-18 RX ORDER — DEXMEDETOMIDINE HYDROCHLORIDE IN 0.9% SODIUM CHLORIDE 4 UG/ML
0.3 INJECTION INTRAVENOUS
Qty: 200 | Refills: 0 | Status: DISCONTINUED | OUTPATIENT
Start: 2019-04-18 | End: 2019-04-19

## 2019-04-18 RX ORDER — METOCLOPRAMIDE HCL 10 MG
10 TABLET ORAL EVERY 8 HOURS
Qty: 0 | Refills: 0 | Status: DISCONTINUED | OUTPATIENT
Start: 2019-04-18 | End: 2019-04-18

## 2019-04-18 RX ORDER — SODIUM BICARBONATE 1 MEQ/ML
25 SYRINGE (ML) INTRAVENOUS ONCE
Qty: 0 | Refills: 0 | Status: COMPLETED | OUTPATIENT
Start: 2019-04-18 | End: 2019-04-18

## 2019-04-18 RX ORDER — VASOPRESSIN 20 [USP'U]/ML
0.05 INJECTION INTRAVENOUS
Qty: 50 | Refills: 0 | Status: DISCONTINUED | OUTPATIENT
Start: 2019-04-18 | End: 2019-04-18

## 2019-04-18 RX ORDER — DOCUSATE SODIUM 100 MG
100 CAPSULE ORAL THREE TIMES A DAY
Qty: 0 | Refills: 0 | Status: DISCONTINUED | OUTPATIENT
Start: 2019-04-18 | End: 2019-04-18

## 2019-04-18 RX ORDER — POTASSIUM CHLORIDE 20 MEQ
20 PACKET (EA) ORAL ONCE
Qty: 0 | Refills: 0 | Status: COMPLETED | OUTPATIENT
Start: 2019-04-18 | End: 2019-04-18

## 2019-04-18 RX ORDER — DOCUSATE SODIUM 100 MG
100 CAPSULE ORAL THREE TIMES A DAY
Qty: 0 | Refills: 0 | Status: DISCONTINUED | OUTPATIENT
Start: 2019-04-18 | End: 2019-04-25

## 2019-04-18 RX ORDER — CEFUROXIME AXETIL 250 MG
1500 TABLET ORAL EVERY 8 HOURS
Qty: 0 | Refills: 0 | Status: COMPLETED | OUTPATIENT
Start: 2019-04-18 | End: 2019-04-20

## 2019-04-18 RX ORDER — ASPIRIN/CALCIUM CARB/MAGNESIUM 324 MG
300 TABLET ORAL ONCE
Qty: 0 | Refills: 0 | Status: COMPLETED | OUTPATIENT
Start: 2019-04-18

## 2019-04-18 RX ORDER — POTASSIUM CHLORIDE 20 MEQ
10 PACKET (EA) ORAL
Qty: 0 | Refills: 0 | Status: COMPLETED | OUTPATIENT
Start: 2019-04-18 | End: 2019-04-18

## 2019-04-18 RX ORDER — PANTOPRAZOLE SODIUM 20 MG/1
40 TABLET, DELAYED RELEASE ORAL DAILY
Qty: 0 | Refills: 0 | Status: DISCONTINUED | OUTPATIENT
Start: 2019-04-18 | End: 2019-04-19

## 2019-04-18 RX ORDER — CALCIUM GLUCONATE 100 MG/ML
1 VIAL (ML) INTRAVENOUS ONCE
Qty: 0 | Refills: 0 | Status: COMPLETED | OUTPATIENT
Start: 2019-04-18 | End: 2019-04-18

## 2019-04-18 RX ORDER — HYDROMORPHONE HYDROCHLORIDE 2 MG/ML
0.5 INJECTION INTRAMUSCULAR; INTRAVENOUS; SUBCUTANEOUS EVERY 6 HOURS
Qty: 0 | Refills: 0 | Status: DISCONTINUED | OUTPATIENT
Start: 2019-04-18 | End: 2019-04-19

## 2019-04-18 RX ORDER — CHLORHEXIDINE GLUCONATE 213 G/1000ML
5 SOLUTION TOPICAL EVERY 4 HOURS
Qty: 0 | Refills: 0 | Status: DISCONTINUED | OUTPATIENT
Start: 2019-04-18 | End: 2019-04-18

## 2019-04-18 RX ORDER — CALCIUM CHLORIDE
1000 POWDER (GRAM) MISCELLANEOUS ONCE
Qty: 0 | Refills: 0 | Status: COMPLETED | OUTPATIENT
Start: 2019-04-18 | End: 2019-04-18

## 2019-04-18 RX ORDER — INSULIN HUMAN 100 [IU]/ML
3 INJECTION, SOLUTION SUBCUTANEOUS
Qty: 100 | Refills: 0 | Status: DISCONTINUED | OUTPATIENT
Start: 2019-04-18 | End: 2019-04-19

## 2019-04-18 RX ORDER — PANTOPRAZOLE SODIUM 20 MG/1
40 TABLET, DELAYED RELEASE ORAL DAILY
Qty: 0 | Refills: 0 | Status: DISCONTINUED | OUTPATIENT
Start: 2019-04-18 | End: 2019-04-18

## 2019-04-18 RX ORDER — SODIUM CHLORIDE 9 MG/ML
1000 INJECTION INTRAMUSCULAR; INTRAVENOUS; SUBCUTANEOUS
Qty: 0 | Refills: 0 | Status: DISCONTINUED | OUTPATIENT
Start: 2019-04-18 | End: 2019-04-25

## 2019-04-18 RX ORDER — ONDANSETRON 8 MG/1
4 TABLET, FILM COATED ORAL ONCE
Qty: 0 | Refills: 0 | Status: DISCONTINUED | OUTPATIENT
Start: 2019-04-18 | End: 2019-04-18

## 2019-04-18 RX ORDER — NOREPINEPHRINE BITARTRATE/D5W 8 MG/250ML
0.04 PLASTIC BAG, INJECTION (ML) INTRAVENOUS
Qty: 8 | Refills: 0 | Status: DISCONTINUED | OUTPATIENT
Start: 2019-04-18 | End: 2019-04-18

## 2019-04-18 RX ORDER — HYDROMORPHONE HYDROCHLORIDE 2 MG/ML
0.5 INJECTION INTRAMUSCULAR; INTRAVENOUS; SUBCUTANEOUS ONCE
Qty: 0 | Refills: 0 | Status: DISCONTINUED | OUTPATIENT
Start: 2019-04-18 | End: 2019-04-18

## 2019-04-18 RX ORDER — NICARDIPINE HYDROCHLORIDE 30 MG/1
5 CAPSULE, EXTENDED RELEASE ORAL
Qty: 40 | Refills: 0 | Status: DISCONTINUED | OUTPATIENT
Start: 2019-04-18 | End: 2019-04-19

## 2019-04-18 RX ORDER — DOBUTAMINE HCL 250MG/20ML
1.25 VIAL (ML) INTRAVENOUS
Qty: 500 | Refills: 0 | Status: DISCONTINUED | OUTPATIENT
Start: 2019-04-18 | End: 2019-04-20

## 2019-04-18 RX ADMIN — Medication 100 MILLIEQUIVALENT(S): at 15:00

## 2019-04-18 RX ADMIN — HYDROMORPHONE HYDROCHLORIDE 0.5 MILLIGRAM(S): 2 INJECTION INTRAMUSCULAR; INTRAVENOUS; SUBCUTANEOUS at 16:00

## 2019-04-18 RX ADMIN — Medication 100 MILLIEQUIVALENT(S): at 17:00

## 2019-04-18 RX ADMIN — Medication 200 GRAM(S): at 15:00

## 2019-04-18 RX ADMIN — Medication 650 MILLIGRAM(S): at 00:59

## 2019-04-18 RX ADMIN — HEPARIN SODIUM 1100 UNIT(S)/HR: 5000 INJECTION INTRAVENOUS; SUBCUTANEOUS at 06:28

## 2019-04-18 RX ADMIN — Medication 125 MILLILITER(S): at 16:30

## 2019-04-18 RX ADMIN — CHLORHEXIDINE GLUCONATE 5 MILLILITER(S): 213 SOLUTION TOPICAL at 22:26

## 2019-04-18 RX ADMIN — PANTOPRAZOLE SODIUM 40 MILLIGRAM(S): 20 TABLET, DELAYED RELEASE ORAL at 17:59

## 2019-04-18 RX ADMIN — Medication 125 MILLILITER(S): at 21:28

## 2019-04-18 RX ADMIN — Medication 100 MILLIGRAM(S): at 17:22

## 2019-04-18 RX ADMIN — Medication 20 MILLIEQUIVALENT(S): at 06:27

## 2019-04-18 RX ADMIN — Medication 125 MILLILITER(S): at 15:00

## 2019-04-18 RX ADMIN — ISOSORBIDE DINITRATE 10 MILLIGRAM(S): 5 TABLET ORAL at 05:40

## 2019-04-18 RX ADMIN — Medication 25 MILLIEQUIVALENT(S): at 17:33

## 2019-04-18 RX ADMIN — Medication 125 MILLILITER(S): at 14:00

## 2019-04-18 RX ADMIN — Medication 10 MILLIGRAM(S): at 22:26

## 2019-04-18 RX ADMIN — HYDROMORPHONE HYDROCHLORIDE 0.5 MILLIGRAM(S): 2 INJECTION INTRAMUSCULAR; INTRAVENOUS; SUBCUTANEOUS at 22:45

## 2019-04-18 RX ADMIN — Medication 100 MILLIEQUIVALENT(S): at 17:22

## 2019-04-18 RX ADMIN — Medication 125 MILLILITER(S): at 18:00

## 2019-04-18 RX ADMIN — Medication 650 MILLIGRAM(S): at 00:29

## 2019-04-18 RX ADMIN — Medication 100 MILLIEQUIVALENT(S): at 15:30

## 2019-04-18 RX ADMIN — HYDROMORPHONE HYDROCHLORIDE 0.5 MILLIGRAM(S): 2 INJECTION INTRAMUSCULAR; INTRAVENOUS; SUBCUTANEOUS at 22:30

## 2019-04-18 RX ADMIN — CHLORHEXIDINE GLUCONATE 5 MILLILITER(S): 213 SOLUTION TOPICAL at 17:23

## 2019-04-18 RX ADMIN — Medication 1000 MILLIGRAM(S): at 16:30

## 2019-04-18 RX ADMIN — CHLORHEXIDINE GLUCONATE 5 MILLILITER(S): 213 SOLUTION TOPICAL at 15:52

## 2019-04-18 RX ADMIN — HYDROMORPHONE HYDROCHLORIDE 0.5 MILLIGRAM(S): 2 INJECTION INTRAMUSCULAR; INTRAVENOUS; SUBCUTANEOUS at 16:15

## 2019-04-18 RX ADMIN — Medication 125 MILLILITER(S): at 17:30

## 2019-04-18 RX ADMIN — Medication 25 MILLIGRAM(S): at 05:40

## 2019-04-18 NOTE — PROGRESS NOTE ADULT - ASSESSMENT
73 y/o Pakistani speaking male PMH GERD, HTN transferred from OSH with chest pain found to have NSTEMI, currently managed medically, pending ischemic evaluation with cardiac catheretization.

## 2019-04-18 NOTE — BRIEF OPERATIVE NOTE - NSICDXBRIEFPROCEDURE_GEN_ALL_CORE_FT
PROCEDURES:  Bypass graft, coronary artery, 3 venous grafts 18-Apr-2019 10:50:24  Bobbi Winter PROCEDURES:  CABG x 4 18-Apr-2019 13:50:07 LIMA-LAD, SVG-PL, SVG-OM-OM Laquita Schneider

## 2019-04-18 NOTE — PROGRESS NOTE ADULT - PROBLEM SELECTOR PLAN 4
DVT PPX: Hep gtt  Diet: DASH      -Previous AST elevated given NSTEMI; no need for workup.

## 2019-04-18 NOTE — PROGRESS NOTE ADULT - SUBJECTIVE AND OBJECTIVE BOX
4;30pm-5pm  Remained critically ill on continuos ICU monitoring.        OBJECTIVE:    T(C): 36.2 (04-18-19 @ 19:00), Max: 37.1 (04-18-19 @ 07:09)  T(F): 97.2 (04-18-19 @ 19:00), Max: 98.8 (04-18-19 @ 07:09)  HR: 93 (04-18-19 @ 20:00) (62 - 107)  BP: 150/122 (04-18-19 @ 07:33) (150/122 - 150/122)  ABP: 155/70 (04-18-19 @ 20:00) (88/39 - 157/69)  ABP(mean): 106 (04-18-19 @ 20:00) (63 - 109)  RR: 13 (04-18-19 @ 20:00) (12 - 32)  SpO2: 100% (04-18-19 @ 20:00) (94% - 100%)                PHYSICAL EXAM:    Daily Weight in kG:   General: intubated & sedated   Neurology: sedated nonfocal, LOCO x 4  Eyes: PERRLA/ EOMI, Gross vision intact  ENT/Neck: Neck supple +ET trachea midline, No JVD, Gross hearing intact  Respiratory:  B/L fine  rales + sternal dressing M & L/R pleural chest tubes  CV: RRR, S1S2, no murmurs, rubs or gallops  Abdominal: Soft, NT, ND +BS,   Extremities: No edema, + peripheral pulses R leg IABP   Skin: No Rashes, Hematoma, Ecchymosis          HOSPITAL MEDICATIONS:  MEDICATIONS  (STANDING):  MEDICATIONS  (STANDING):  aspirin enteric coated 325 milliGRAM(s) Oral daily  aspirin Suppository 300 milliGRAM(s) Rectal once  cefuroxime  IVPB 1500 milliGRAM(s) IV Intermittent every 8 hours  chlorhexidine 0.12% Liquid 5 milliLiter(s) Oral Mucosa every 4 hours  dexmedetomidine Infusion 0.3 MICROgram(s)/kG/Hr (5.775 mL/Hr) IV Continuous <Continuous>  DOBUTamine Infusion 10 MICROgram(s)/kG/Min (23.1 mL/Hr) IV Continuous <Continuous>  docusate sodium 100 milliGRAM(s) Oral three times a day  influenza   Vaccine 0.5 milliLiter(s) IntraMuscular once  insulin Infusion 3 Unit(s)/Hr (3 mL/Hr) IV Continuous <Continuous>  meperidine     Injectable 25 milliGRAM(s) IV Push once  metoclopramide Injectable 10 milliGRAM(s) IV Push every 8 hours  niCARdipine Infusion 5 mG/Hr (25 mL/Hr) IV Continuous <Continuous>  norepinephrine Infusion 0.04 MICROgram(s)/kG/Min (5.775 mL/Hr) IV Continuous <Continuous>  pantoprazole  Injectable 40 milliGRAM(s) IV Push daily  potassium chloride  10 mEq/50 mL IVPB 10 milliEquivalent(s) IV Intermittent every 1 hour  potassium chloride  10 mEq/50 mL IVPB 10 milliEquivalent(s) IV Intermittent every 1 hour  potassium chloride  10 mEq/50 mL IVPB 10 milliEquivalent(s) IV Intermittent every 1 hour  sodium chloride 0.9%. 1000 milliLiter(s) (10 mL/Hr) IV Continuous <Continuous>  vasopressin Infusion 0.05 Unit(s)/Min (3 mL/Hr) IV Continuous <Continuous>    MEDICATIONS  (PRN):        MEDICATIONS  (PRN):      LABS:              10.6                 x    | x    | x            18.7  >-----------< 112     ------------------------< x                     30.0                 x    | x    | x                                            Ca x     Mg x     Ph x                         RADIOLOGY:  X Reviewed and interpreted by me            Assessment and Plan:   HPI:  74M PMH GERD, HTN BIBEMS, transferred from [facility] after presenting initially with chest pain. Pt states he's had midsternal/epigastric chest pain, rated 9/10 at worst, non-radiating, worst w rest, alleviated by ambulating, associated w nausea and gas. He has had these symptoms for months however pt symptoms worsening in frequency and thus he was brought to the hospital by his daughter whom he has been visiting since 2/2018 from [facility]. Pt found to have EKG, elevated trop and Pro-bnp (>6000), ASA, Brilinta, Hep loaded and transferred to [facility] for further management. (14 Apr 2019 10:24)  S/P Cath with multivessel CAD, low EF <30%  Post op CABG complicated with cardiogenic shock, lactic acidosis & severe hyperglycemia  Plan f/u ABGs, Spo2, CXR, wean to extubate once hemodynamically stable.  SR, back up pacing in place, monitor for post op bleeding, ++ chest tube outputs.     Transfuse products, PRBC cont to monitor serial lactate     Dobutamine gtt,  Pressors to maintain MAP > 70 f/u perfusion indices, volume resuscitation  Rtleg IAP with good diastolic augmentation  Glycemic control, INS gtt  Daphnie op antibiotics.        I have spent 30 minutes providing critical care management to this patient.

## 2019-04-18 NOTE — PROGRESS NOTE ADULT - PROBLEM SELECTOR PLAN 3
Unknown baseline, will continue to monitor.

## 2019-04-18 NOTE — PRE-ANESTHESIA EVALUATION ADULT - NSANTHPMHFT_GEN_ALL_CORE
history of partial gastrectomy for gastric ulcer, history of left temporal craniectomy after an accident in 1976

## 2019-04-18 NOTE — BRIEF OPERATIVE NOTE - NSICDXBRIEFPOSTOP_GEN_ALL_CORE_FT
POST-OP DIAGNOSIS:  Multiple risk factors for coronary artery disease 18-Apr-2019 10:51:20  Bobbi Winter

## 2019-04-18 NOTE — PROGRESS NOTE ADULT - SUBJECTIVE AND OBJECTIVE BOX
Patient is a 74y old  Male who presents with a chief complaint of NSTEMI (17 Apr 2019 21:19)        SUBJECTIVE / OVERNIGHT EVENTS:      MEDICATIONS  (STANDING):  atorvastatin 80 milliGRAM(s) Oral at bedtime  cefuroxime  IVPB 1500 milliGRAM(s) IV Intermittent once  chlorhexidine 4% Liquid 1 Application(s) Topical <User Schedule>  heparin  Infusion.  Unit(s)/Hr (9.5 mL/Hr) IV Continuous <Continuous>  hydrALAZINE 25 milliGRAM(s) Oral every 8 hours  influenza   Vaccine 0.5 milliLiter(s) IntraMuscular once  isosorbide   dinitrate Tablet (ISORDIL) 10 milliGRAM(s) Oral three times a day    MEDICATIONS  (PRN):  heparin  Injectable 4700 Unit(s) IV Push every 6 hours PRN For aPTT less than 40        CAPILLARY BLOOD GLUCOSE        I&O's Summary    17 Apr 2019 07:01  -  18 Apr 2019 07:00  --------------------------------------------------------  IN: 753 mL / OUT: 2075 mL / NET: -1322 mL        PHYSICAL EXAM  GENERAL: NAD, well-developed  HEAD:  Atraumatic, Normocephalic  EYES: EOMI, PERRLA, conjunctiva and sclera clear  NECK: Supple, No JVD  CHEST/LUNG: Clear to auscultation bilaterally; No wheeze  HEART: Regular rate and rhythm; No murmurs, rubs, or gallops  ABDOMEN: Soft, Nontender, Nondistended; Bowel sounds present  EXTREMITIES:  2+ Peripheral Pulses, No clubbing, cyanosis, or edema  PSYCH: AAOx3  SKIN: No rashes or lesions    LABS:                        13.5   8.7   )-----------( 145      ( 18 Apr 2019 05:32 )             39.4     04-18    134<L>  |  103  |  18  ----------------------------<  103<H>  3.8   |  21<L>  |  1.33<H>    Ca    8.8      18 Apr 2019 05:32  Phos  3.2     04-18  Mg     2.2     04-18    TPro  6.7  /  Alb  3.2<L>  /  TBili  0.6  /  DBili  x   /  AST  28  /  ALT  25  /  AlkPhos  96  04-18    PT/INR - ( 18 Apr 2019 05:32 )   PT: 14.2 sec;   INR: 1.24 ratio         PTT - ( 18 Apr 2019 05:32 )  PTT:71.1 sec          RADIOLOGY & ADDITIONAL TESTS:    Imaging Personally Reviewed:  Consultant(s) Notes Reviewed:    Care Discussed with Consultants/Other Providers: Patient is a 74y old  Male who presents with a chief complaint of NSTEMI (17 Apr 2019 21:19)        SUBJECTIVE / OVERNIGHT EVENTS: Pt was not seen by me today. Already taken to OR.      MEDICATIONS  (STANDING):  atorvastatin 80 milliGRAM(s) Oral at bedtime  cefuroxime  IVPB 1500 milliGRAM(s) IV Intermittent once  chlorhexidine 4% Liquid 1 Application(s) Topical <User Schedule>  heparin  Infusion.  Unit(s)/Hr (9.5 mL/Hr) IV Continuous <Continuous>  hydrALAZINE 25 milliGRAM(s) Oral every 8 hours  influenza   Vaccine 0.5 milliLiter(s) IntraMuscular once  isosorbide   dinitrate Tablet (ISORDIL) 10 milliGRAM(s) Oral three times a day    MEDICATIONS  (PRN):  heparin  Injectable 4700 Unit(s) IV Push every 6 hours PRN For aPTT less than 40        CAPILLARY BLOOD GLUCOSE        I&O's Summary    17 Apr 2019 07:01  -  18 Apr 2019 07:00  --------------------------------------------------------  IN: 753 mL / OUT: 2075 mL / NET: -1322 mL        LABS:                        13.5   8.7   )-----------( 145      ( 18 Apr 2019 05:32 )             39.4     04-18    134<L>  |  103  |  18  ----------------------------<  103<H>  3.8   |  21<L>  |  1.33<H>    Ca    8.8      18 Apr 2019 05:32  Phos  3.2     04-18  Mg     2.2     04-18    TPro  6.7  /  Alb  3.2<L>  /  TBili  0.6  /  DBili  x   /  AST  28  /  ALT  25  /  AlkPhos  96  04-18    PT/INR - ( 18 Apr 2019 05:32 )   PT: 14.2 sec;   INR: 1.24 ratio         PTT - ( 18 Apr 2019 05:32 )  PTT:71.1 sec          RADIOLOGY & ADDITIONAL TESTS:    Imaging Personally Reviewed:  Consultant(s) Notes Reviewed:    Care Discussed with Consultants/Other Providers:

## 2019-04-19 LAB
ALBUMIN SERPL ELPH-MCNC: 3.5 G/DL — SIGNIFICANT CHANGE UP (ref 3.3–5)
ALP SERPL-CCNC: 39 U/L — LOW (ref 40–120)
ALT FLD-CCNC: 19 U/L — SIGNIFICANT CHANGE UP (ref 10–45)
ANION GAP SERPL CALC-SCNC: 10 MMOL/L — SIGNIFICANT CHANGE UP (ref 5–17)
APTT BLD: 33.8 SEC — SIGNIFICANT CHANGE UP (ref 27.5–36.3)
AST SERPL-CCNC: 40 U/L — SIGNIFICANT CHANGE UP (ref 10–40)
BASE EXCESS BLDV CALC-SCNC: -0.7 MMOL/L — SIGNIFICANT CHANGE UP (ref -2–2)
BASE EXCESS BLDV CALC-SCNC: -1 MMOL/L — SIGNIFICANT CHANGE UP (ref -2–2)
BASE EXCESS BLDV CALC-SCNC: -1.9 MMOL/L — SIGNIFICANT CHANGE UP (ref -2–2)
BASE EXCESS BLDV CALC-SCNC: -2.3 MMOL/L — LOW (ref -2–2)
BASE EXCESS BLDV CALC-SCNC: -2.7 MMOL/L — LOW (ref -2–2)
BASE EXCESS BLDV CALC-SCNC: -3.9 MMOL/L — LOW (ref -2–2)
BASE EXCESS BLDV CALC-SCNC: -5.9 MMOL/L — LOW (ref -2–2)
BASOPHILS # BLD AUTO: 0 K/UL — SIGNIFICANT CHANGE UP (ref 0–0.2)
BASOPHILS NFR BLD AUTO: 0.1 % — SIGNIFICANT CHANGE UP (ref 0–2)
BILIRUB SERPL-MCNC: 0.7 MG/DL — SIGNIFICANT CHANGE UP (ref 0.2–1.2)
BUN SERPL-MCNC: 12 MG/DL — SIGNIFICANT CHANGE UP (ref 7–23)
CALCIUM SERPL-MCNC: 7.8 MG/DL — LOW (ref 8.4–10.5)
CHLORIDE SERPL-SCNC: 107 MMOL/L — SIGNIFICANT CHANGE UP (ref 96–108)
CO2 BLDV-SCNC: 20 MMOL/L — LOW (ref 22–30)
CO2 BLDV-SCNC: 21 MMOL/L — LOW (ref 22–30)
CO2 BLDV-SCNC: 23 MMOL/L — SIGNIFICANT CHANGE UP (ref 22–30)
CO2 BLDV-SCNC: 24 MMOL/L — SIGNIFICANT CHANGE UP (ref 22–30)
CO2 BLDV-SCNC: 26 MMOL/L — SIGNIFICANT CHANGE UP (ref 22–30)
CO2 SERPL-SCNC: 22 MMOL/L — SIGNIFICANT CHANGE UP (ref 22–31)
CREAT SERPL-MCNC: 0.99 MG/DL — SIGNIFICANT CHANGE UP (ref 0.5–1.3)
EOSINOPHIL # BLD AUTO: 0 K/UL — SIGNIFICANT CHANGE UP (ref 0–0.5)
EOSINOPHIL NFR BLD AUTO: 0.1 % — SIGNIFICANT CHANGE UP (ref 0–6)
GAS PNL BLDA: SIGNIFICANT CHANGE UP
GAS PNL BLDV: SIGNIFICANT CHANGE UP
GLUCOSE BLDC GLUCOMTR-MCNC: 112 MG/DL — HIGH (ref 70–99)
GLUCOSE BLDC GLUCOMTR-MCNC: 112 MG/DL — HIGH (ref 70–99)
GLUCOSE BLDC GLUCOMTR-MCNC: 121 MG/DL — HIGH (ref 70–99)
GLUCOSE BLDC GLUCOMTR-MCNC: 133 MG/DL — HIGH (ref 70–99)
GLUCOSE BLDC GLUCOMTR-MCNC: 152 MG/DL — HIGH (ref 70–99)
GLUCOSE BLDC GLUCOMTR-MCNC: 171 MG/DL — HIGH (ref 70–99)
GLUCOSE BLDC GLUCOMTR-MCNC: 92 MG/DL — SIGNIFICANT CHANGE UP (ref 70–99)
GLUCOSE SERPL-MCNC: 135 MG/DL — HIGH (ref 70–99)
HCO3 BLDV-SCNC: 19 MMOL/L — LOW (ref 21–29)
HCO3 BLDV-SCNC: 20 MMOL/L — LOW (ref 21–29)
HCO3 BLDV-SCNC: 22 MMOL/L — SIGNIFICANT CHANGE UP (ref 21–29)
HCO3 BLDV-SCNC: 23 MMOL/L — SIGNIFICANT CHANGE UP (ref 21–29)
HCO3 BLDV-SCNC: 24 MMOL/L — SIGNIFICANT CHANGE UP (ref 21–29)
HCT VFR BLD CALC: 28.4 % — LOW (ref 39–50)
HGB BLD-MCNC: 10 G/DL — LOW (ref 13–17)
HOROWITZ INDEX BLDV+IHG-RTO: 28 — SIGNIFICANT CHANGE UP
HOROWITZ INDEX BLDV+IHG-RTO: 32 — SIGNIFICANT CHANGE UP
HOROWITZ INDEX BLDV+IHG-RTO: 36 — SIGNIFICANT CHANGE UP
INR BLD: 1.44 RATIO — HIGH (ref 0.88–1.16)
INR BLD: 1.46 RATIO — HIGH (ref 0.88–1.16)
LYMPHOCYTES # BLD AUTO: 0.5 K/UL — LOW (ref 1–3.3)
LYMPHOCYTES # BLD AUTO: 3.7 % — LOW (ref 13–44)
MAGNESIUM SERPL-MCNC: 2.4 MG/DL — SIGNIFICANT CHANGE UP (ref 1.6–2.6)
MCHC RBC-ENTMCNC: 32.6 PG — SIGNIFICANT CHANGE UP (ref 27–34)
MCHC RBC-ENTMCNC: 35.2 GM/DL — SIGNIFICANT CHANGE UP (ref 32–36)
MCV RBC AUTO: 92.4 FL — SIGNIFICANT CHANGE UP (ref 80–100)
MONOCYTES # BLD AUTO: 0.8 K/UL — SIGNIFICANT CHANGE UP (ref 0–0.9)
MONOCYTES NFR BLD AUTO: 5.8 % — SIGNIFICANT CHANGE UP (ref 2–14)
NEUTROPHILS # BLD AUTO: 11.7 K/UL — HIGH (ref 1.8–7.4)
NEUTROPHILS NFR BLD AUTO: 90.3 % — HIGH (ref 43–77)
PCO2 BLDV: 35 MMHG — SIGNIFICANT CHANGE UP (ref 35–50)
PCO2 BLDV: 36 MMHG — SIGNIFICANT CHANGE UP (ref 35–50)
PCO2 BLDV: 39 MMHG — SIGNIFICANT CHANGE UP (ref 35–50)
PCO2 BLDV: 40 MMHG — SIGNIFICANT CHANGE UP (ref 35–50)
PCO2 BLDV: 44 MMHG — SIGNIFICANT CHANGE UP (ref 35–50)
PH BLDV: 7.34 — LOW (ref 7.35–7.45)
PH BLDV: 7.36 — SIGNIFICANT CHANGE UP (ref 7.35–7.45)
PH BLDV: 7.36 — SIGNIFICANT CHANGE UP (ref 7.35–7.45)
PH BLDV: 7.37 — SIGNIFICANT CHANGE UP (ref 7.35–7.45)
PH BLDV: 7.38 — SIGNIFICANT CHANGE UP (ref 7.35–7.45)
PH BLDV: 7.38 — SIGNIFICANT CHANGE UP (ref 7.35–7.45)
PH BLDV: 7.39 — SIGNIFICANT CHANGE UP (ref 7.35–7.45)
PHOSPHATE SERPL-MCNC: 2.2 MG/DL — LOW (ref 2.5–4.5)
PLATELET # BLD AUTO: 75 K/UL — LOW (ref 150–400)
PO2 BLDV: 31 MMHG — SIGNIFICANT CHANGE UP (ref 25–45)
PO2 BLDV: 33 MMHG — SIGNIFICANT CHANGE UP (ref 25–45)
PO2 BLDV: 34 MMHG — SIGNIFICANT CHANGE UP (ref 25–45)
PO2 BLDV: 34 MMHG — SIGNIFICANT CHANGE UP (ref 25–45)
PO2 BLDV: 39 MMHG — SIGNIFICANT CHANGE UP (ref 25–45)
PO2 BLDV: 41 MMHG — SIGNIFICANT CHANGE UP (ref 25–45)
PO2 BLDV: 43 MMHG — SIGNIFICANT CHANGE UP (ref 25–45)
POTASSIUM SERPL-MCNC: 4.4 MMOL/L — SIGNIFICANT CHANGE UP (ref 3.5–5.3)
POTASSIUM SERPL-SCNC: 4.4 MMOL/L — SIGNIFICANT CHANGE UP (ref 3.5–5.3)
PROT SERPL-MCNC: 5.2 G/DL — LOW (ref 6–8.3)
PROTHROM AB SERPL-ACNC: 16.7 SEC — HIGH (ref 10–12.9)
PROTHROM AB SERPL-ACNC: 16.8 SEC — HIGH (ref 10–12.9)
RBC # BLD: 3.07 M/UL — LOW (ref 4.2–5.8)
RBC # FLD: 12.3 % — SIGNIFICANT CHANGE UP (ref 10.3–14.5)
SAO2 % BLDV: 54 % — LOW (ref 67–88)
SAO2 % BLDV: 58 % — LOW (ref 67–88)
SAO2 % BLDV: 62 % — LOW (ref 67–88)
SAO2 % BLDV: 63 % — LOW (ref 67–88)
SAO2 % BLDV: 70 % — SIGNIFICANT CHANGE UP (ref 67–88)
SAO2 % BLDV: 70 % — SIGNIFICANT CHANGE UP (ref 67–88)
SAO2 % BLDV: 77 % — SIGNIFICANT CHANGE UP (ref 67–88)
SODIUM SERPL-SCNC: 139 MMOL/L — SIGNIFICANT CHANGE UP (ref 135–145)
WBC # BLD: 12.9 K/UL — HIGH (ref 3.8–10.5)
WBC # FLD AUTO: 12.9 K/UL — HIGH (ref 3.8–10.5)

## 2019-04-19 PROCEDURE — 71045 X-RAY EXAM CHEST 1 VIEW: CPT | Mod: 26

## 2019-04-19 PROCEDURE — 99291 CRITICAL CARE FIRST HOUR: CPT

## 2019-04-19 PROCEDURE — 33968 REMOVE AORTIC ASSIST DEVICE: CPT | Mod: 58

## 2019-04-19 PROCEDURE — 93010 ELECTROCARDIOGRAM REPORT: CPT

## 2019-04-19 RX ORDER — ACETAMINOPHEN 500 MG
1000 TABLET ORAL ONCE
Qty: 0 | Refills: 0 | Status: COMPLETED | OUTPATIENT
Start: 2019-04-19 | End: 2019-04-19

## 2019-04-19 RX ORDER — ENOXAPARIN SODIUM 100 MG/ML
40 INJECTION SUBCUTANEOUS DAILY
Qty: 0 | Refills: 0 | Status: DISCONTINUED | OUTPATIENT
Start: 2019-04-19 | End: 2019-04-25

## 2019-04-19 RX ORDER — POTASSIUM CHLORIDE 20 MEQ
10 PACKET (EA) ORAL
Qty: 0 | Refills: 0 | Status: DISCONTINUED | OUTPATIENT
Start: 2019-04-19 | End: 2019-04-19

## 2019-04-19 RX ORDER — CLOPIDOGREL BISULFATE 75 MG/1
75 TABLET, FILM COATED ORAL DAILY
Qty: 0 | Refills: 0 | Status: DISCONTINUED | OUTPATIENT
Start: 2019-04-19 | End: 2019-04-25

## 2019-04-19 RX ORDER — OXYCODONE HYDROCHLORIDE 5 MG/1
10 TABLET ORAL EVERY 4 HOURS
Qty: 0 | Refills: 0 | Status: DISCONTINUED | OUTPATIENT
Start: 2019-04-19 | End: 2019-04-25

## 2019-04-19 RX ORDER — ALBUMIN HUMAN 25 %
250 VIAL (ML) INTRAVENOUS
Qty: 0 | Refills: 0 | Status: COMPLETED | OUTPATIENT
Start: 2019-04-19 | End: 2019-04-19

## 2019-04-19 RX ORDER — ACETAMINOPHEN 500 MG
325 TABLET ORAL EVERY 4 HOURS
Qty: 0 | Refills: 0 | Status: DISCONTINUED | OUTPATIENT
Start: 2019-04-19 | End: 2019-04-25

## 2019-04-19 RX ORDER — POTASSIUM CHLORIDE 20 MEQ
10 PACKET (EA) ORAL
Qty: 0 | Refills: 0 | Status: COMPLETED | OUTPATIENT
Start: 2019-04-19 | End: 2019-04-19

## 2019-04-19 RX ORDER — INSULIN LISPRO 100/ML
VIAL (ML) SUBCUTANEOUS AT BEDTIME
Qty: 0 | Refills: 0 | Status: DISCONTINUED | OUTPATIENT
Start: 2019-04-19 | End: 2019-04-19

## 2019-04-19 RX ORDER — OXYCODONE HYDROCHLORIDE 5 MG/1
5 TABLET ORAL EVERY 4 HOURS
Qty: 0 | Refills: 0 | Status: DISCONTINUED | OUTPATIENT
Start: 2019-04-19 | End: 2019-04-25

## 2019-04-19 RX ORDER — ASPIRIN/CALCIUM CARB/MAGNESIUM 324 MG
300 TABLET ORAL ONCE
Qty: 0 | Refills: 0 | Status: COMPLETED | OUTPATIENT
Start: 2019-04-19 | End: 2019-04-19

## 2019-04-19 RX ORDER — HYDRALAZINE HCL 50 MG
10 TABLET ORAL ONCE
Qty: 0 | Refills: 0 | Status: COMPLETED | OUTPATIENT
Start: 2019-04-19 | End: 2019-04-19

## 2019-04-19 RX ORDER — ALBUMIN HUMAN 25 %
250 VIAL (ML) INTRAVENOUS ONCE
Qty: 0 | Refills: 0 | Status: COMPLETED | OUTPATIENT
Start: 2019-04-19 | End: 2019-04-19

## 2019-04-19 RX ORDER — POTASSIUM CHLORIDE 20 MEQ
10 PACKET (EA) ORAL ONCE
Qty: 0 | Refills: 0 | Status: COMPLETED | OUTPATIENT
Start: 2019-04-19 | End: 2019-04-19

## 2019-04-19 RX ORDER — CALCIUM GLUCONATE 100 MG/ML
1 VIAL (ML) INTRAVENOUS ONCE
Qty: 0 | Refills: 0 | Status: COMPLETED | OUTPATIENT
Start: 2019-04-19 | End: 2019-04-19

## 2019-04-19 RX ORDER — ATORVASTATIN CALCIUM 80 MG/1
40 TABLET, FILM COATED ORAL AT BEDTIME
Qty: 0 | Refills: 0 | Status: DISCONTINUED | OUTPATIENT
Start: 2019-04-19 | End: 2019-04-25

## 2019-04-19 RX ORDER — FENTANYL CITRATE 50 UG/ML
50 INJECTION INTRAVENOUS ONCE
Qty: 0 | Refills: 0 | Status: DISCONTINUED | OUTPATIENT
Start: 2019-04-19 | End: 2019-04-19

## 2019-04-19 RX ORDER — HYDROMORPHONE HYDROCHLORIDE 2 MG/ML
0.5 INJECTION INTRAMUSCULAR; INTRAVENOUS; SUBCUTANEOUS ONCE
Qty: 0 | Refills: 0 | Status: DISCONTINUED | OUTPATIENT
Start: 2019-04-19 | End: 2019-04-19

## 2019-04-19 RX ORDER — NICARDIPINE HYDROCHLORIDE 30 MG/1
3 CAPSULE, EXTENDED RELEASE ORAL
Qty: 40 | Refills: 0 | Status: DISCONTINUED | OUTPATIENT
Start: 2019-04-19 | End: 2019-04-19

## 2019-04-19 RX ORDER — ALPRAZOLAM 0.25 MG
0.25 TABLET ORAL ONCE
Qty: 0 | Refills: 0 | Status: DISCONTINUED | OUTPATIENT
Start: 2019-04-19 | End: 2019-04-19

## 2019-04-19 RX ORDER — HYDRALAZINE HCL 50 MG
25 TABLET ORAL EVERY 8 HOURS
Qty: 0 | Refills: 0 | Status: DISCONTINUED | OUTPATIENT
Start: 2019-04-19 | End: 2019-04-20

## 2019-04-19 RX ORDER — INSULIN LISPRO 100/ML
VIAL (ML) SUBCUTANEOUS
Qty: 0 | Refills: 0 | Status: DISCONTINUED | OUTPATIENT
Start: 2019-04-19 | End: 2019-04-19

## 2019-04-19 RX ORDER — INSULIN HUMAN 100 [IU]/ML
3 INJECTION, SOLUTION SUBCUTANEOUS
Qty: 100 | Refills: 0 | Status: DISCONTINUED | OUTPATIENT
Start: 2019-04-19 | End: 2019-04-20

## 2019-04-19 RX ORDER — PANTOPRAZOLE SODIUM 20 MG/1
40 TABLET, DELAYED RELEASE ORAL
Qty: 0 | Refills: 0 | Status: DISCONTINUED | OUTPATIENT
Start: 2019-04-19 | End: 2019-04-25

## 2019-04-19 RX ADMIN — Medication 50 MILLIEQUIVALENT(S): at 06:35

## 2019-04-19 RX ADMIN — FENTANYL CITRATE 50 MICROGRAM(S): 50 INJECTION INTRAVENOUS at 18:27

## 2019-04-19 RX ADMIN — Medication 25 MILLIGRAM(S): at 14:31

## 2019-04-19 RX ADMIN — NICARDIPINE HYDROCHLORIDE 25 MG/HR: 30 CAPSULE, EXTENDED RELEASE ORAL at 02:34

## 2019-04-19 RX ADMIN — Medication 125 MILLILITER(S): at 04:27

## 2019-04-19 RX ADMIN — Medication 10 MILLIGRAM(S): at 18:05

## 2019-04-19 RX ADMIN — Medication 1000 MILLIGRAM(S): at 09:00

## 2019-04-19 RX ADMIN — DEXMEDETOMIDINE HYDROCHLORIDE IN 0.9% SODIUM CHLORIDE 5.78 MICROGRAM(S)/KG/HR: 4 INJECTION INTRAVENOUS at 02:34

## 2019-04-19 RX ADMIN — Medication 500 MILLILITER(S): at 21:00

## 2019-04-19 RX ADMIN — Medication 10 MILLIGRAM(S): at 05:25

## 2019-04-19 RX ADMIN — Medication 200 GRAM(S): at 14:36

## 2019-04-19 RX ADMIN — INSULIN HUMAN 3 UNIT(S)/HR: 100 INJECTION, SOLUTION SUBCUTANEOUS at 02:35

## 2019-04-19 RX ADMIN — Medication 125 MILLILITER(S): at 04:46

## 2019-04-19 RX ADMIN — CLOPIDOGREL BISULFATE 75 MILLIGRAM(S): 75 TABLET, FILM COATED ORAL at 17:38

## 2019-04-19 RX ADMIN — Medication 100 MILLIGRAM(S): at 14:31

## 2019-04-19 RX ADMIN — HYDROMORPHONE HYDROCHLORIDE 0.5 MILLIGRAM(S): 2 INJECTION INTRAMUSCULAR; INTRAVENOUS; SUBCUTANEOUS at 11:29

## 2019-04-19 RX ADMIN — HYDROMORPHONE HYDROCHLORIDE 0.5 MILLIGRAM(S): 2 INJECTION INTRAMUSCULAR; INTRAVENOUS; SUBCUTANEOUS at 16:17

## 2019-04-19 RX ADMIN — Medication 100 MILLIGRAM(S): at 15:30

## 2019-04-19 RX ADMIN — Medication 50 MILLIEQUIVALENT(S): at 12:52

## 2019-04-19 RX ADMIN — Medication 10 MILLIGRAM(S): at 21:32

## 2019-04-19 RX ADMIN — Medication 325 MILLIGRAM(S): at 22:00

## 2019-04-19 RX ADMIN — Medication 325 MILLIGRAM(S): at 14:56

## 2019-04-19 RX ADMIN — Medication 50 MILLIEQUIVALENT(S): at 12:25

## 2019-04-19 RX ADMIN — OXYCODONE HYDROCHLORIDE 5 MILLIGRAM(S): 5 TABLET ORAL at 14:26

## 2019-04-19 RX ADMIN — Medication 25 MILLIGRAM(S): at 21:32

## 2019-04-19 RX ADMIN — Medication 100 MILLIGRAM(S): at 07:59

## 2019-04-19 RX ADMIN — HYDROMORPHONE HYDROCHLORIDE 0.5 MILLIGRAM(S): 2 INJECTION INTRAMUSCULAR; INTRAVENOUS; SUBCUTANEOUS at 16:00

## 2019-04-19 RX ADMIN — Medication 325 MILLIGRAM(S): at 17:31

## 2019-04-19 RX ADMIN — Medication 2: at 18:05

## 2019-04-19 RX ADMIN — HYDROMORPHONE HYDROCHLORIDE 0.5 MILLIGRAM(S): 2 INJECTION INTRAMUSCULAR; INTRAVENOUS; SUBCUTANEOUS at 11:45

## 2019-04-19 RX ADMIN — HYDROMORPHONE HYDROCHLORIDE 0.5 MILLIGRAM(S): 2 INJECTION INTRAMUSCULAR; INTRAVENOUS; SUBCUTANEOUS at 06:10

## 2019-04-19 RX ADMIN — Medication 500 MILLILITER(S): at 21:33

## 2019-04-19 RX ADMIN — PANTOPRAZOLE SODIUM 40 MILLIGRAM(S): 20 TABLET, DELAYED RELEASE ORAL at 11:30

## 2019-04-19 RX ADMIN — Medication 325 MILLIGRAM(S): at 14:31

## 2019-04-19 RX ADMIN — Medication 50 MILLIEQUIVALENT(S): at 06:07

## 2019-04-19 RX ADMIN — OXYCODONE HYDROCHLORIDE 10 MILLIGRAM(S): 5 TABLET ORAL at 19:04

## 2019-04-19 RX ADMIN — HYDROMORPHONE HYDROCHLORIDE 0.5 MILLIGRAM(S): 2 INJECTION INTRAMUSCULAR; INTRAVENOUS; SUBCUTANEOUS at 06:25

## 2019-04-19 RX ADMIN — FENTANYL CITRATE 50 MICROGRAM(S): 50 INJECTION INTRAVENOUS at 19:04

## 2019-04-19 RX ADMIN — Medication 50 MILLIEQUIVALENT(S): at 07:59

## 2019-04-19 RX ADMIN — Medication 325 MILLIGRAM(S): at 21:32

## 2019-04-19 RX ADMIN — Medication 62.5 MILLIMOLE(S): at 04:19

## 2019-04-19 RX ADMIN — ENOXAPARIN SODIUM 40 MILLIGRAM(S): 100 INJECTION SUBCUTANEOUS at 14:31

## 2019-04-19 RX ADMIN — CHLORHEXIDINE GLUCONATE 5 MILLILITER(S): 213 SOLUTION TOPICAL at 02:33

## 2019-04-19 RX ADMIN — ATORVASTATIN CALCIUM 40 MILLIGRAM(S): 80 TABLET, FILM COATED ORAL at 21:32

## 2019-04-19 RX ADMIN — Medication 100 MILLIGRAM(S): at 05:25

## 2019-04-19 RX ADMIN — Medication 325 MILLIGRAM(S): at 14:26

## 2019-04-19 RX ADMIN — OXYCODONE HYDROCHLORIDE 10 MILLIGRAM(S): 5 TABLET ORAL at 18:27

## 2019-04-19 RX ADMIN — Medication 0.25 MILLIGRAM(S): at 17:31

## 2019-04-19 RX ADMIN — Medication 10 MILLIGRAM(S): at 14:31

## 2019-04-19 RX ADMIN — Medication 100 MILLIGRAM(S): at 00:11

## 2019-04-19 RX ADMIN — Medication 300 MILLIGRAM(S): at 02:32

## 2019-04-19 RX ADMIN — Medication 50 MILLIEQUIVALENT(S): at 14:32

## 2019-04-19 RX ADMIN — Medication 100 MILLIGRAM(S): at 21:32

## 2019-04-19 RX ADMIN — Medication 50 MILLIEQUIVALENT(S): at 17:38

## 2019-04-19 RX ADMIN — OXYCODONE HYDROCHLORIDE 5 MILLIGRAM(S): 5 TABLET ORAL at 14:54

## 2019-04-19 RX ADMIN — Medication 11.55 MICROGRAM(S)/KG/MIN: at 02:32

## 2019-04-19 RX ADMIN — Medication 400 MILLIGRAM(S): at 08:46

## 2019-04-19 NOTE — PROGRESS NOTE ADULT - SUBJECTIVE AND OBJECTIVE BOX
NEYMAR GARCIA   MRN#: [id number]     The patient is a 74y Male who was seen, evaluated, & examined with the CTICU staff on rounds and later in the day with a multidisciplinary care plan formulated & implemented.  All available clinical, laboratory, radiographic, pharmacologic, and electrocardiographic data were reviewed & analyzed.      The patient was in the CTICU in critical condition secondary to persistent cardiopulmonary dysfunction, cardiogenic shock-cardiovascular dysfunction, and stress hyperglycemia.      Respiratory status required supplemental oxygen, the following of ABG’s with A-line monitoring, and continuous pulse oximetry monitoring for support & to evaluate for & prevent further decompensation secondary to persistent cardiopulmonary dysfunction and cardiogenic shock-cardiovascular dysfunction.     Invasive hemodynamic monitoring with an A-line was required for the following of continuous MAP/BP monitoring to ensure adequate cardiovascular support and to evaluate for & help prevent decompensation while receiving an IABP and an IV Dobutamine drip secondary to cardiogenic shock-cardiovascular dysfunction.    Metabolic stability, stress hyperglycemia, & infection prophylaxis required an IV regular Insulin drip & the following of serial glucose levels to help achieve & maintain euglycemia.      Patient required critical care management and I provided 30 minutes of non-continuous care to the patient.  Discussed at length with the CTICU staff and helped coordinate care.

## 2019-04-19 NOTE — CHART NOTE - NSCHARTNOTEFT_GEN_A_CORE
PROCEDURE NOTE  REMOVAL OF INTRA AORTIC BALLOON PUMP    The IABP (intra-aortic balloon pump) was weaned according to protocol.  Hemodynamics remained stable.  Pulses in the right lower extremity are palpable.  The patient was placed in the supine position.  The insertion site was identified and the sutures were removed.  The IABP was turned off and the balloon deflated.  The IABP was then removed.  Direct pressure was applied for 45  minutes.  A sandbag was applied and is  to remain in place for three hours.    Monitoring of the right groin and both lower extremities including neuro-vascular checks and vital signs every 15 minutes  x4, then every 30 minutes x 2, then every 1 hr x 4 was ordered.      Complications: None

## 2019-04-19 NOTE — CHART NOTE - NSCHARTNOTEFT_GEN_A_CORE
6-hour post-removal of IABP evaluation      Vital signs are stable, neuro-vascular status of the lower extremities is intact/stable and there is no evidence of hematoma of  the right groin.

## 2019-04-20 LAB
ALBUMIN SERPL ELPH-MCNC: 4.1 G/DL — SIGNIFICANT CHANGE UP (ref 3.3–5)
ALP SERPL-CCNC: 45 U/L — SIGNIFICANT CHANGE UP (ref 40–120)
ALT FLD-CCNC: 22 U/L — SIGNIFICANT CHANGE UP (ref 10–45)
ANION GAP SERPL CALC-SCNC: 15 MMOL/L — SIGNIFICANT CHANGE UP (ref 5–17)
AST SERPL-CCNC: 34 U/L — SIGNIFICANT CHANGE UP (ref 10–40)
BASE EXCESS BLDV CALC-SCNC: -1.3 MMOL/L — SIGNIFICANT CHANGE UP (ref -2–2)
BASE EXCESS BLDV CALC-SCNC: -2.8 MMOL/L — LOW (ref -2–2)
BILIRUB SERPL-MCNC: 0.6 MG/DL — SIGNIFICANT CHANGE UP (ref 0.2–1.2)
BLD GP AB SCN SERPL QL: NEGATIVE — SIGNIFICANT CHANGE UP
BUN SERPL-MCNC: 19 MG/DL — SIGNIFICANT CHANGE UP (ref 7–23)
CALCIUM SERPL-MCNC: 8.2 MG/DL — LOW (ref 8.4–10.5)
CHLORIDE SERPL-SCNC: 104 MMOL/L — SIGNIFICANT CHANGE UP (ref 96–108)
CK MB BLD-MCNC: 2.4 % — SIGNIFICANT CHANGE UP (ref 0–3.5)
CK MB CFR SERPL CALC: 14 NG/ML — HIGH (ref 0–6.7)
CK SERPL-CCNC: 585 U/L — HIGH (ref 30–200)
CO2 BLDV-SCNC: 22 MMOL/L — SIGNIFICANT CHANGE UP (ref 22–30)
CO2 BLDV-SCNC: 23 MMOL/L — SIGNIFICANT CHANGE UP (ref 22–30)
CO2 SERPL-SCNC: 17 MMOL/L — LOW (ref 22–31)
CREAT SERPL-MCNC: 1.2 MG/DL — SIGNIFICANT CHANGE UP (ref 0.5–1.3)
GAS PNL BLDA: SIGNIFICANT CHANGE UP
GAS PNL BLDV: SIGNIFICANT CHANGE UP
GAS PNL BLDV: SIGNIFICANT CHANGE UP
GLUCOSE BLDC GLUCOMTR-MCNC: 115 MG/DL — HIGH (ref 70–99)
GLUCOSE BLDC GLUCOMTR-MCNC: 117 MG/DL — HIGH (ref 70–99)
GLUCOSE BLDC GLUCOMTR-MCNC: 119 MG/DL — HIGH (ref 70–99)
GLUCOSE BLDC GLUCOMTR-MCNC: 120 MG/DL — HIGH (ref 70–99)
GLUCOSE BLDC GLUCOMTR-MCNC: 121 MG/DL — HIGH (ref 70–99)
GLUCOSE BLDC GLUCOMTR-MCNC: 122 MG/DL — HIGH (ref 70–99)
GLUCOSE BLDC GLUCOMTR-MCNC: 127 MG/DL — HIGH (ref 70–99)
GLUCOSE BLDC GLUCOMTR-MCNC: 128 MG/DL — HIGH (ref 70–99)
GLUCOSE BLDC GLUCOMTR-MCNC: 140 MG/DL — HIGH (ref 70–99)
GLUCOSE BLDC GLUCOMTR-MCNC: 145 MG/DL — HIGH (ref 70–99)
GLUCOSE BLDC GLUCOMTR-MCNC: 153 MG/DL — HIGH (ref 70–99)
GLUCOSE BLDC GLUCOMTR-MCNC: 155 MG/DL — HIGH (ref 70–99)
GLUCOSE BLDC GLUCOMTR-MCNC: 156 MG/DL — HIGH (ref 70–99)
GLUCOSE BLDC GLUCOMTR-MCNC: 157 MG/DL — HIGH (ref 70–99)
GLUCOSE BLDC GLUCOMTR-MCNC: 90 MG/DL — SIGNIFICANT CHANGE UP (ref 70–99)
GLUCOSE BLDC GLUCOMTR-MCNC: 90 MG/DL — SIGNIFICANT CHANGE UP (ref 70–99)
GLUCOSE BLDC GLUCOMTR-MCNC: 93 MG/DL — SIGNIFICANT CHANGE UP (ref 70–99)
GLUCOSE BLDC GLUCOMTR-MCNC: 96 MG/DL — SIGNIFICANT CHANGE UP (ref 70–99)
GLUCOSE SERPL-MCNC: 124 MG/DL — HIGH (ref 70–99)
HCO3 BLDV-SCNC: 20 MMOL/L — LOW (ref 21–29)
HCO3 BLDV-SCNC: 22 MMOL/L — SIGNIFICANT CHANGE UP (ref 21–29)
HCT VFR BLD CALC: 25.3 % — LOW (ref 39–50)
HGB BLD-MCNC: 9.1 G/DL — LOW (ref 13–17)
HOROWITZ INDEX BLDV+IHG-RTO: 21 — SIGNIFICANT CHANGE UP
HOROWITZ INDEX BLDV+IHG-RTO: 21 — SIGNIFICANT CHANGE UP
MAGNESIUM SERPL-MCNC: 2.5 MG/DL — SIGNIFICANT CHANGE UP (ref 1.6–2.6)
MCHC RBC-ENTMCNC: 33.6 PG — SIGNIFICANT CHANGE UP (ref 27–34)
MCHC RBC-ENTMCNC: 35.9 GM/DL — SIGNIFICANT CHANGE UP (ref 32–36)
MCV RBC AUTO: 93.4 FL — SIGNIFICANT CHANGE UP (ref 80–100)
PCO2 BLDV: 32 MMHG — LOW (ref 35–50)
PCO2 BLDV: 34 MMHG — LOW (ref 35–50)
PH BLDV: 7.42 — SIGNIFICANT CHANGE UP (ref 7.35–7.45)
PH BLDV: 7.43 — SIGNIFICANT CHANGE UP (ref 7.35–7.45)
PHOSPHATE SERPL-MCNC: 2.4 MG/DL — LOW (ref 2.5–4.5)
PLATELET # BLD AUTO: 89 K/UL — LOW (ref 150–400)
PO2 BLDV: 35 MMHG — SIGNIFICANT CHANGE UP (ref 25–45)
PO2 BLDV: 37 MMHG — SIGNIFICANT CHANGE UP (ref 25–45)
POTASSIUM SERPL-MCNC: 4.3 MMOL/L — SIGNIFICANT CHANGE UP (ref 3.5–5.3)
POTASSIUM SERPL-SCNC: 4.3 MMOL/L — SIGNIFICANT CHANGE UP (ref 3.5–5.3)
PROT SERPL-MCNC: 6.2 G/DL — SIGNIFICANT CHANGE UP (ref 6–8.3)
RBC # BLD: 2.71 M/UL — LOW (ref 4.2–5.8)
RBC # FLD: 12.6 % — SIGNIFICANT CHANGE UP (ref 10.3–14.5)
RH IG SCN BLD-IMP: POSITIVE — SIGNIFICANT CHANGE UP
SAO2 % BLDV: 65 % — LOW (ref 67–88)
SAO2 % BLDV: 66 % — LOW (ref 67–88)
SODIUM SERPL-SCNC: 136 MMOL/L — SIGNIFICANT CHANGE UP (ref 135–145)
TROPONIN T, HIGH SENSITIVITY RESULT: 2027 NG/L — HIGH (ref 0–51)
WBC # BLD: 22.4 K/UL — HIGH (ref 3.8–10.5)
WBC # FLD AUTO: 22.4 K/UL — HIGH (ref 3.8–10.5)

## 2019-04-20 PROCEDURE — 71045 X-RAY EXAM CHEST 1 VIEW: CPT | Mod: 26

## 2019-04-20 PROCEDURE — 71045 X-RAY EXAM CHEST 1 VIEW: CPT | Mod: 26,77

## 2019-04-20 PROCEDURE — 99291 CRITICAL CARE FIRST HOUR: CPT

## 2019-04-20 RX ORDER — HYDRALAZINE HCL 50 MG
10 TABLET ORAL ONCE
Qty: 0 | Refills: 0 | Status: COMPLETED | OUTPATIENT
Start: 2019-04-20 | End: 2019-04-20

## 2019-04-20 RX ORDER — HYDRALAZINE HCL 50 MG
25 TABLET ORAL ONCE
Qty: 0 | Refills: 0 | Status: COMPLETED | OUTPATIENT
Start: 2019-04-20 | End: 2019-04-20

## 2019-04-20 RX ORDER — ALPRAZOLAM 0.25 MG
0.25 TABLET ORAL ONCE
Qty: 0 | Refills: 0 | Status: DISCONTINUED | OUTPATIENT
Start: 2019-04-20 | End: 2019-04-20

## 2019-04-20 RX ORDER — INSULIN LISPRO 100/ML
VIAL (ML) SUBCUTANEOUS
Qty: 0 | Refills: 0 | Status: DISCONTINUED | OUTPATIENT
Start: 2019-04-20 | End: 2019-04-24

## 2019-04-20 RX ORDER — ALBUMIN HUMAN 25 %
250 VIAL (ML) INTRAVENOUS ONCE
Qty: 0 | Refills: 0 | Status: COMPLETED | OUTPATIENT
Start: 2019-04-20 | End: 2019-04-20

## 2019-04-20 RX ORDER — ACETAMINOPHEN 500 MG
1000 TABLET ORAL ONCE
Qty: 0 | Refills: 0 | Status: COMPLETED | OUTPATIENT
Start: 2019-04-20 | End: 2019-04-20

## 2019-04-20 RX ORDER — LOSARTAN POTASSIUM 100 MG/1
50 TABLET, FILM COATED ORAL DAILY
Qty: 0 | Refills: 0 | Status: DISCONTINUED | OUTPATIENT
Start: 2019-04-20 | End: 2019-04-21

## 2019-04-20 RX ORDER — SODIUM NITROPRUSSIDE 50 MG/2ML
20 INJECTION INTRAVENOUS
Qty: 100 | Refills: 0 | Status: DISCONTINUED | OUTPATIENT
Start: 2019-04-20 | End: 2019-04-20

## 2019-04-20 RX ORDER — ALPRAZOLAM 0.25 MG
0.25 TABLET ORAL EVERY 8 HOURS
Qty: 0 | Refills: 0 | Status: DISCONTINUED | OUTPATIENT
Start: 2019-04-20 | End: 2019-04-24

## 2019-04-20 RX ORDER — INSULIN LISPRO 100/ML
VIAL (ML) SUBCUTANEOUS AT BEDTIME
Qty: 0 | Refills: 0 | Status: DISCONTINUED | OUTPATIENT
Start: 2019-04-20 | End: 2019-04-24

## 2019-04-20 RX ORDER — HYDROMORPHONE HYDROCHLORIDE 2 MG/ML
0.5 INJECTION INTRAMUSCULAR; INTRAVENOUS; SUBCUTANEOUS ONCE
Qty: 0 | Refills: 0 | Status: DISCONTINUED | OUTPATIENT
Start: 2019-04-20 | End: 2019-04-20

## 2019-04-20 RX ORDER — MILRINONE LACTATE 1 MG/ML
0.05 INJECTION, SOLUTION INTRAVENOUS
Qty: 20 | Refills: 0 | Status: DISCONTINUED | OUTPATIENT
Start: 2019-04-20 | End: 2019-04-22

## 2019-04-20 RX ORDER — POTASSIUM CHLORIDE 20 MEQ
10 PACKET (EA) ORAL ONCE
Qty: 0 | Refills: 0 | Status: COMPLETED | OUTPATIENT
Start: 2019-04-20 | End: 2019-04-20

## 2019-04-20 RX ORDER — NITROGLYCERIN 6.5 MG
20 CAPSULE, EXTENDED RELEASE ORAL
Qty: 50 | Refills: 0 | Status: DISCONTINUED | OUTPATIENT
Start: 2019-04-20 | End: 2019-04-20

## 2019-04-20 RX ORDER — HYDRALAZINE HCL 50 MG
50 TABLET ORAL EVERY 8 HOURS
Qty: 0 | Refills: 0 | Status: DISCONTINUED | OUTPATIENT
Start: 2019-04-21 | End: 2019-04-21

## 2019-04-20 RX ADMIN — Medication 5.78 MICROGRAM(S)/KG/MIN: at 12:10

## 2019-04-20 RX ADMIN — OXYCODONE HYDROCHLORIDE 10 MILLIGRAM(S): 5 TABLET ORAL at 11:48

## 2019-04-20 RX ADMIN — CLOPIDOGREL BISULFATE 75 MILLIGRAM(S): 75 TABLET, FILM COATED ORAL at 12:52

## 2019-04-20 RX ADMIN — INSULIN HUMAN 3 UNIT(S)/HR: 100 INJECTION, SOLUTION SUBCUTANEOUS at 07:30

## 2019-04-20 RX ADMIN — OXYCODONE HYDROCHLORIDE 10 MILLIGRAM(S): 5 TABLET ORAL at 19:30

## 2019-04-20 RX ADMIN — Medication 10 MILLIGRAM(S): at 14:16

## 2019-04-20 RX ADMIN — Medication 0.25 MILLIGRAM(S): at 20:45

## 2019-04-20 RX ADMIN — Medication 0.25 MILLIGRAM(S): at 08:13

## 2019-04-20 RX ADMIN — Medication 400 MILLIGRAM(S): at 02:58

## 2019-04-20 RX ADMIN — MILRINONE LACTATE 4.62 MICROGRAM(S)/KG/MIN: 1 INJECTION, SOLUTION INTRAVENOUS at 17:30

## 2019-04-20 RX ADMIN — Medication 10 MILLIGRAM(S): at 11:19

## 2019-04-20 RX ADMIN — Medication 62.5 MILLIMOLE(S): at 04:22

## 2019-04-20 RX ADMIN — Medication 25 MILLIGRAM(S): at 20:45

## 2019-04-20 RX ADMIN — HYDROMORPHONE HYDROCHLORIDE 0.5 MILLIGRAM(S): 2 INJECTION INTRAMUSCULAR; INTRAVENOUS; SUBCUTANEOUS at 04:45

## 2019-04-20 RX ADMIN — Medication 325 MILLIGRAM(S): at 05:15

## 2019-04-20 RX ADMIN — Medication 1000 MILLIGRAM(S): at 03:30

## 2019-04-20 RX ADMIN — Medication 25 MILLIGRAM(S): at 05:15

## 2019-04-20 RX ADMIN — OXYCODONE HYDROCHLORIDE 10 MILLIGRAM(S): 5 TABLET ORAL at 11:18

## 2019-04-20 RX ADMIN — OXYCODONE HYDROCHLORIDE 10 MILLIGRAM(S): 5 TABLET ORAL at 20:00

## 2019-04-20 RX ADMIN — Medication 325 MILLIGRAM(S): at 12:52

## 2019-04-20 RX ADMIN — Medication 100 MILLIGRAM(S): at 05:15

## 2019-04-20 RX ADMIN — Medication 500 MILLILITER(S): at 02:58

## 2019-04-20 RX ADMIN — Medication 6 MICROGRAM(S)/MIN: at 06:51

## 2019-04-20 RX ADMIN — OXYCODONE HYDROCHLORIDE 10 MILLIGRAM(S): 5 TABLET ORAL at 01:50

## 2019-04-20 RX ADMIN — Medication 100 MILLIGRAM(S): at 20:46

## 2019-04-20 RX ADMIN — ATORVASTATIN CALCIUM 40 MILLIGRAM(S): 80 TABLET, FILM COATED ORAL at 20:45

## 2019-04-20 RX ADMIN — Medication 10 MILLIGRAM(S): at 05:15

## 2019-04-20 RX ADMIN — LOSARTAN POTASSIUM 50 MILLIGRAM(S): 100 TABLET, FILM COATED ORAL at 09:29

## 2019-04-20 RX ADMIN — HYDROMORPHONE HYDROCHLORIDE 0.5 MILLIGRAM(S): 2 INJECTION INTRAMUSCULAR; INTRAVENOUS; SUBCUTANEOUS at 04:23

## 2019-04-20 RX ADMIN — OXYCODONE HYDROCHLORIDE 10 MILLIGRAM(S): 5 TABLET ORAL at 01:20

## 2019-04-20 RX ADMIN — ENOXAPARIN SODIUM 40 MILLIGRAM(S): 100 INJECTION SUBCUTANEOUS at 11:19

## 2019-04-20 RX ADMIN — Medication 10 MILLIGRAM(S): at 22:45

## 2019-04-20 RX ADMIN — Medication 25 MILLIGRAM(S): at 23:49

## 2019-04-20 RX ADMIN — Medication 100 MILLIGRAM(S): at 15:16

## 2019-04-20 RX ADMIN — PANTOPRAZOLE SODIUM 40 MILLIGRAM(S): 20 TABLET, DELAYED RELEASE ORAL at 05:15

## 2019-04-20 RX ADMIN — Medication 0.25 MILLIGRAM(S): at 15:16

## 2019-04-20 RX ADMIN — Medication 100 MILLIGRAM(S): at 00:42

## 2019-04-20 RX ADMIN — Medication 50 MILLIEQUIVALENT(S): at 05:28

## 2019-04-20 RX ADMIN — Medication 25 MILLIGRAM(S): at 15:16

## 2019-04-20 NOTE — PHYSICAL THERAPY INITIAL EVALUATION ADULT - PERTINENT HX OF CURRENT PROBLEM, REHAB EVAL
Pt is 74M admitted 4/14/19 PMHx GERD, HTN BIBEMS, transferred from [facility] after p/w chest pain. Pt states he's had midsternal/epigastric chest pain, rated 9/10 at worst, non-radiating, worst w/rest, alleviated by ambulating, associated w nausea and gas.

## 2019-04-20 NOTE — PHYSICAL THERAPY INITIAL EVALUATION ADULT - DISCHARGE DISPOSITION, PT EVAL
anticipate home with home PT for improved strength balance & endurance for activity, home safety assessment, return to baseline, RW for ambulation, assist from family as needed/home/home w/ assist/home w/ home PT

## 2019-04-20 NOTE — PHYSICAL THERAPY INITIAL EVALUATION ADULT - PRECAUTIONS/LIMITATIONS, REHAB EVAL
He has had these symptoms for months however pt symptoms worsening in frequency and thus he was brought to the hospital by his daughter whom he has been visiting since 2/2018 from [facility]. Pt found to have EKG, elevated trop and Pro-bnp (>6000), ASA, Brilinta, Hep loaded and transferred to [facility] for further management./cardiac precautions/fall precautions/sternal precautions

## 2019-04-20 NOTE — PHYSICAL THERAPY INITIAL EVALUATION ADULT - ADDITIONAL COMMENTS
Pt states that he resides in private home with spouse and dtr, 6 steps to enter with handrail, pt reports that dtr is handicap, can enter with ramp. PTA independent with mobility and ADL's, owns no DME.

## 2019-04-20 NOTE — PHYSICAL THERAPY INITIAL EVALUATION ADULT - GAIT DEVIATIONS NOTED, PT EVAL
increased time in double stance/decreased velocity of limb motion/decreased step length/decreased stride length/decreased virginia/decreased weight-shifting ability

## 2019-04-20 NOTE — PHYSICAL THERAPY INITIAL EVALUATION ADULT - PLANNED THERAPY INTERVENTIONS, PT EVAL
GOALS: Pt will negotiate 12 steps with unilateral handrail & step to pattern with independence in 4wks/gait training/transfer training/bed mobility training

## 2019-04-20 NOTE — PROGRESS NOTE ADULT - SUBJECTIVE AND OBJECTIVE BOX
NEYMAR GARCIA   MRN#: [id number]     The patient is a 74y Male who was seen, evaluated, & examined with the CTICU staff on rounds and later in the day with a multidisciplinary care plan formulated & implemented.  All available clinical, laboratory, radiographic, pharmacologic, and electrocardiographic data were reviewed & analyzed.      The patient was in the CTICU in critical condition secondary to persistent cardiopulmonary dysfunction, cardiogenic shock-cardiovascular dysfunction, and hyperlactatemia.      Respiratory status required supplemental oxygen, the following of ABG’s with A-line monitoring, and continuous pulse oximetry monitoring for support & to evaluate for & prevent further decompensation secondary to persistent cardiopulmonary dysfunction and cardiogenic shock-cardiovascular dysfunction.     Invasive hemodynamic monitoring with an A-line was required for the following of continuous MAP/BP monitoring to ensure adequate cardiovascular support and to evaluate for & help prevent decompensation while receiving an IV Dobutamine drip  and Losartan secondary to cardiogenic shock-cardiovascular dysfunction.    Patient required critical care management and I provided 30 minutes of non-continuous care to the patient.  Discussed at length with the CTICU staff and helped coordinate care.

## 2019-04-20 NOTE — PHYSICAL THERAPY INITIAL EVALUATION ADULT - GENERAL OBSERVATIONS, REHAB EVAL
received OOB sitting in chair, German speaking with  phone #[phone] used t/o, A&Ox4, following commands, willing to participate, s/p CABGx4(4/18), +external pacer, +chest tube x2, +IV, +Sandra, +CTU monitoring. Pt educated on sternal precuations.

## 2019-04-21 LAB
ALBUMIN SERPL ELPH-MCNC: 3.7 G/DL — SIGNIFICANT CHANGE UP (ref 3.3–5)
ALP SERPL-CCNC: 57 U/L — SIGNIFICANT CHANGE UP (ref 40–120)
ALT FLD-CCNC: 31 U/L — SIGNIFICANT CHANGE UP (ref 10–45)
ANION GAP SERPL CALC-SCNC: 10 MMOL/L — SIGNIFICANT CHANGE UP (ref 5–17)
AST SERPL-CCNC: 43 U/L — HIGH (ref 10–40)
BASE EXCESS BLDV CALC-SCNC: -0.1 MMOL/L — SIGNIFICANT CHANGE UP (ref -2–2)
BASE EXCESS BLDV CALC-SCNC: -1 MMOL/L — SIGNIFICANT CHANGE UP (ref -2–2)
BASE EXCESS BLDV CALC-SCNC: -1.1 MMOL/L — SIGNIFICANT CHANGE UP (ref -2–2)
BASE EXCESS BLDV CALC-SCNC: 0.1 MMOL/L — SIGNIFICANT CHANGE UP (ref -2–2)
BILIRUB SERPL-MCNC: 0.7 MG/DL — SIGNIFICANT CHANGE UP (ref 0.2–1.2)
BUN SERPL-MCNC: 22 MG/DL — SIGNIFICANT CHANGE UP (ref 7–23)
CALCIUM SERPL-MCNC: 8.3 MG/DL — LOW (ref 8.4–10.5)
CHLORIDE SERPL-SCNC: 105 MMOL/L — SIGNIFICANT CHANGE UP (ref 96–108)
CO2 BLDV-SCNC: 23 MMOL/L — SIGNIFICANT CHANGE UP (ref 22–30)
CO2 BLDV-SCNC: 24 MMOL/L — SIGNIFICANT CHANGE UP (ref 22–30)
CO2 BLDV-SCNC: 25 MMOL/L — SIGNIFICANT CHANGE UP (ref 22–30)
CO2 BLDV-SCNC: 25 MMOL/L — SIGNIFICANT CHANGE UP (ref 22–30)
CO2 SERPL-SCNC: 19 MMOL/L — LOW (ref 22–31)
CREAT SERPL-MCNC: 1.09 MG/DL — SIGNIFICANT CHANGE UP (ref 0.5–1.3)
GAS PNL BLDA: SIGNIFICANT CHANGE UP
GAS PNL BLDV: SIGNIFICANT CHANGE UP
GLUCOSE BLDC GLUCOMTR-MCNC: 121 MG/DL — HIGH (ref 70–99)
GLUCOSE SERPL-MCNC: 121 MG/DL — HIGH (ref 70–99)
HCO3 BLDV-SCNC: 22 MMOL/L — SIGNIFICANT CHANGE UP (ref 21–29)
HCO3 BLDV-SCNC: 23 MMOL/L — SIGNIFICANT CHANGE UP (ref 21–29)
HCO3 BLDV-SCNC: 24 MMOL/L — SIGNIFICANT CHANGE UP (ref 21–29)
HCO3 BLDV-SCNC: 24 MMOL/L — SIGNIFICANT CHANGE UP (ref 21–29)
HCT VFR BLD CALC: 30.3 % — LOW (ref 39–50)
HGB BLD-MCNC: 10.5 G/DL — LOW (ref 13–17)
HOROWITZ INDEX BLDV+IHG-RTO: 21 — SIGNIFICANT CHANGE UP
MAGNESIUM SERPL-MCNC: 2.7 MG/DL — HIGH (ref 1.6–2.6)
MCHC RBC-ENTMCNC: 33.4 PG — SIGNIFICANT CHANGE UP (ref 27–34)
MCHC RBC-ENTMCNC: 34.7 GM/DL — SIGNIFICANT CHANGE UP (ref 32–36)
MCV RBC AUTO: 96.1 FL — SIGNIFICANT CHANGE UP (ref 80–100)
PCO2 BLDV: 34 MMHG — LOW (ref 35–50)
PCO2 BLDV: 36 MMHG — SIGNIFICANT CHANGE UP (ref 35–50)
PCO2 BLDV: 37 MMHG — SIGNIFICANT CHANGE UP (ref 35–50)
PCO2 BLDV: 37 MMHG — SIGNIFICANT CHANGE UP (ref 35–50)
PH BLDV: 7.41 — SIGNIFICANT CHANGE UP (ref 7.35–7.45)
PH BLDV: 7.42 — SIGNIFICANT CHANGE UP (ref 7.35–7.45)
PH BLDV: 7.44 — SIGNIFICANT CHANGE UP (ref 7.35–7.45)
PH BLDV: 7.44 — SIGNIFICANT CHANGE UP (ref 7.35–7.45)
PHOSPHATE SERPL-MCNC: 2.4 MG/DL — LOW (ref 2.5–4.5)
PLATELET # BLD AUTO: 94 K/UL — LOW (ref 150–400)
PO2 BLDV: 34 MMHG — SIGNIFICANT CHANGE UP (ref 25–45)
PO2 BLDV: 34 MMHG — SIGNIFICANT CHANGE UP (ref 25–45)
PO2 BLDV: 35 MMHG — SIGNIFICANT CHANGE UP (ref 25–45)
PO2 BLDV: 38 MMHG — SIGNIFICANT CHANGE UP (ref 25–45)
POTASSIUM SERPL-MCNC: 4.4 MMOL/L — SIGNIFICANT CHANGE UP (ref 3.5–5.3)
POTASSIUM SERPL-SCNC: 4.4 MMOL/L — SIGNIFICANT CHANGE UP (ref 3.5–5.3)
PROT SERPL-MCNC: 5.7 G/DL — LOW (ref 6–8.3)
RBC # BLD: 3.16 M/UL — LOW (ref 4.2–5.8)
RBC # FLD: 13.1 % — SIGNIFICANT CHANGE UP (ref 10.3–14.5)
SAO2 % BLDV: 56 % — LOW (ref 67–88)
SAO2 % BLDV: 60 % — LOW (ref 67–88)
SAO2 % BLDV: 62 % — LOW (ref 67–88)
SAO2 % BLDV: 69 % — SIGNIFICANT CHANGE UP (ref 67–88)
SODIUM SERPL-SCNC: 134 MMOL/L — LOW (ref 135–145)
WBC # BLD: 21.4 K/UL — HIGH (ref 3.8–10.5)
WBC # FLD AUTO: 21.4 K/UL — HIGH (ref 3.8–10.5)

## 2019-04-21 PROCEDURE — 93306 TTE W/DOPPLER COMPLETE: CPT | Mod: 26

## 2019-04-21 PROCEDURE — 71045 X-RAY EXAM CHEST 1 VIEW: CPT | Mod: 26

## 2019-04-21 PROCEDURE — 93010 ELECTROCARDIOGRAM REPORT: CPT

## 2019-04-21 PROCEDURE — 99291 CRITICAL CARE FIRST HOUR: CPT

## 2019-04-21 RX ORDER — LOSARTAN POTASSIUM 100 MG/1
100 TABLET, FILM COATED ORAL DAILY
Qty: 0 | Refills: 0 | Status: DISCONTINUED | OUTPATIENT
Start: 2019-04-21 | End: 2019-04-25

## 2019-04-21 RX ORDER — AMLODIPINE BESYLATE 2.5 MG/1
10 TABLET ORAL DAILY
Qty: 0 | Refills: 0 | Status: DISCONTINUED | OUTPATIENT
Start: 2019-04-21 | End: 2019-04-25

## 2019-04-21 RX ORDER — HYDRALAZINE HCL 50 MG
75 TABLET ORAL EVERY 8 HOURS
Qty: 0 | Refills: 0 | Status: DISCONTINUED | OUTPATIENT
Start: 2019-04-21 | End: 2019-04-22

## 2019-04-21 RX ORDER — POTASSIUM CHLORIDE 20 MEQ
10 PACKET (EA) ORAL ONCE
Qty: 0 | Refills: 0 | Status: COMPLETED | OUTPATIENT
Start: 2019-04-21 | End: 2019-04-21

## 2019-04-21 RX ADMIN — Medication 325 MILLIGRAM(S): at 13:23

## 2019-04-21 RX ADMIN — Medication 325 MILLIGRAM(S): at 13:53

## 2019-04-21 RX ADMIN — LOSARTAN POTASSIUM 100 MILLIGRAM(S): 100 TABLET, FILM COATED ORAL at 06:20

## 2019-04-21 RX ADMIN — MILRINONE LACTATE 4.62 MICROGRAM(S)/KG/MIN: 1 INJECTION, SOLUTION INTRAVENOUS at 18:21

## 2019-04-21 RX ADMIN — Medication 50 MILLIGRAM(S): at 06:20

## 2019-04-21 RX ADMIN — CLOPIDOGREL BISULFATE 75 MILLIGRAM(S): 75 TABLET, FILM COATED ORAL at 13:23

## 2019-04-21 RX ADMIN — PANTOPRAZOLE SODIUM 40 MILLIGRAM(S): 20 TABLET, DELAYED RELEASE ORAL at 06:20

## 2019-04-21 RX ADMIN — ATORVASTATIN CALCIUM 40 MILLIGRAM(S): 80 TABLET, FILM COATED ORAL at 21:22

## 2019-04-21 RX ADMIN — Medication 62.5 MILLIMOLE(S): at 03:47

## 2019-04-21 RX ADMIN — Medication 100 MILLIGRAM(S): at 21:22

## 2019-04-21 RX ADMIN — Medication 0.25 MILLIGRAM(S): at 13:23

## 2019-04-21 RX ADMIN — Medication 75 MILLIGRAM(S): at 13:24

## 2019-04-21 RX ADMIN — Medication 325 MILLIGRAM(S): at 18:51

## 2019-04-21 RX ADMIN — ENOXAPARIN SODIUM 40 MILLIGRAM(S): 100 INJECTION SUBCUTANEOUS at 13:24

## 2019-04-21 RX ADMIN — Medication 325 MILLIGRAM(S): at 18:21

## 2019-04-21 RX ADMIN — Medication 325 MILLIGRAM(S): at 10:42

## 2019-04-21 RX ADMIN — Medication 325 MILLIGRAM(S): at 10:12

## 2019-04-21 RX ADMIN — OXYCODONE HYDROCHLORIDE 10 MILLIGRAM(S): 5 TABLET ORAL at 23:05

## 2019-04-21 RX ADMIN — Medication 50 MILLIEQUIVALENT(S): at 18:20

## 2019-04-21 RX ADMIN — Medication 100 MILLIGRAM(S): at 06:20

## 2019-04-21 RX ADMIN — Medication 2: at 11:33

## 2019-04-21 RX ADMIN — Medication 325 MILLIGRAM(S): at 06:20

## 2019-04-21 RX ADMIN — Medication 100 MILLIGRAM(S): at 13:24

## 2019-04-21 RX ADMIN — Medication 325 MILLIGRAM(S): at 21:22

## 2019-04-21 RX ADMIN — AMLODIPINE BESYLATE 10 MILLIGRAM(S): 2.5 TABLET ORAL at 22:27

## 2019-04-21 RX ADMIN — Medication 325 MILLIGRAM(S): at 21:52

## 2019-04-21 RX ADMIN — Medication 75 MILLIGRAM(S): at 21:22

## 2019-04-21 RX ADMIN — Medication 0.25 MILLIGRAM(S): at 06:20

## 2019-04-21 RX ADMIN — Medication 0.25 MILLIGRAM(S): at 21:18

## 2019-04-21 RX ADMIN — OXYCODONE HYDROCHLORIDE 10 MILLIGRAM(S): 5 TABLET ORAL at 23:35

## 2019-04-21 NOTE — PROGRESS NOTE ADULT - SUBJECTIVE AND OBJECTIVE BOX
NEYMAR GARCIA   MRN#: [id number]     The patient is a 74y Male who was seen, evaluated, & examined with the CTICU staff on rounds and later in the day with a multidisciplinary care plan formulated & implemented.  All available clinical, laboratory, radiographic, pharmacologic, and electrocardiographic data were reviewed & analyzed.      The patient was in the CTICU in critical condition secondary to persistent cardiopulmonary dysfunction, cardiogenic shock-cardiovascular dysfunction, and hyperlactatemia.      Respiratory status required supplemental oxygen, the following of ABG’s with A-line monitoring, and continuous pulse oximetry monitoring for support & to evaluate for & prevent further decompensation secondary to persistent cardiopulmonary dysfunction and cardiogenic shock-cardiovascular dysfunction.     Invasive hemodynamic monitoring with an A-line was required for the following of continuous MAP/BP monitoring to ensure adequate cardiovascular support and to evaluate for & help prevent decompensation while receiving an IV Dobutamine drip  and Losartan secondary to cardiogenic shock-cardiovascular dysfunction.    Patient required critical care management and I provided 30 minutes of non-continuous care to the patient.  Discussed at length with the CTICU staff and helped coordinate care.

## 2019-04-22 LAB
ALBUMIN SERPL ELPH-MCNC: 3.3 G/DL — SIGNIFICANT CHANGE UP (ref 3.3–5)
ALP SERPL-CCNC: 92 U/L — SIGNIFICANT CHANGE UP (ref 40–120)
ALT FLD-CCNC: 47 U/L — HIGH (ref 10–45)
ANION GAP SERPL CALC-SCNC: 11 MMOL/L — SIGNIFICANT CHANGE UP (ref 5–17)
ANION GAP SERPL CALC-SCNC: 12 MMOL/L — SIGNIFICANT CHANGE UP (ref 5–17)
AST SERPL-CCNC: 66 U/L — HIGH (ref 10–40)
BASE EXCESS BLDV CALC-SCNC: -0.1 MMOL/L — SIGNIFICANT CHANGE UP (ref -2–2)
BASE EXCESS BLDV CALC-SCNC: -0.1 MMOL/L — SIGNIFICANT CHANGE UP (ref -2–2)
BASE EXCESS BLDV CALC-SCNC: -0.4 MMOL/L — SIGNIFICANT CHANGE UP (ref -2–2)
BILIRUB SERPL-MCNC: 1 MG/DL — SIGNIFICANT CHANGE UP (ref 0.2–1.2)
BUN SERPL-MCNC: 22 MG/DL — SIGNIFICANT CHANGE UP (ref 7–23)
BUN SERPL-MCNC: 27 MG/DL — HIGH (ref 7–23)
CALCIUM SERPL-MCNC: 7.9 MG/DL — LOW (ref 8.4–10.5)
CALCIUM SERPL-MCNC: 8.3 MG/DL — LOW (ref 8.4–10.5)
CHLORIDE SERPL-SCNC: 107 MMOL/L — SIGNIFICANT CHANGE UP (ref 96–108)
CHLORIDE SERPL-SCNC: 108 MMOL/L — SIGNIFICANT CHANGE UP (ref 96–108)
CO2 BLDV-SCNC: 24 MMOL/L — SIGNIFICANT CHANGE UP (ref 22–30)
CO2 BLDV-SCNC: 24 MMOL/L — SIGNIFICANT CHANGE UP (ref 22–30)
CO2 BLDV-SCNC: 25 MMOL/L — SIGNIFICANT CHANGE UP (ref 22–30)
CO2 SERPL-SCNC: 20 MMOL/L — LOW (ref 22–31)
CO2 SERPL-SCNC: 21 MMOL/L — LOW (ref 22–31)
CREAT SERPL-MCNC: 1.1 MG/DL — SIGNIFICANT CHANGE UP (ref 0.5–1.3)
CREAT SERPL-MCNC: 1.17 MG/DL — SIGNIFICANT CHANGE UP (ref 0.5–1.3)
GAS PNL BLDA: SIGNIFICANT CHANGE UP
GAS PNL BLDV: SIGNIFICANT CHANGE UP
GLUCOSE BLDC GLUCOMTR-MCNC: 107 MG/DL — HIGH (ref 70–99)
GLUCOSE BLDC GLUCOMTR-MCNC: 98 MG/DL — SIGNIFICANT CHANGE UP (ref 70–99)
GLUCOSE SERPL-MCNC: 105 MG/DL — HIGH (ref 70–99)
GLUCOSE SERPL-MCNC: 112 MG/DL — HIGH (ref 70–99)
HCO3 BLDV-SCNC: 23 MMOL/L — SIGNIFICANT CHANGE UP (ref 21–29)
HCO3 BLDV-SCNC: 23 MMOL/L — SIGNIFICANT CHANGE UP (ref 21–29)
HCO3 BLDV-SCNC: 24 MMOL/L — SIGNIFICANT CHANGE UP (ref 21–29)
HCT VFR BLD CALC: 29.4 % — LOW (ref 39–50)
HGB BLD-MCNC: 10.2 G/DL — LOW (ref 13–17)
HOROWITZ INDEX BLDV+IHG-RTO: 21 — SIGNIFICANT CHANGE UP
HOROWITZ INDEX BLDV+IHG-RTO: 21 — SIGNIFICANT CHANGE UP
MAGNESIUM SERPL-MCNC: 2.6 MG/DL — SIGNIFICANT CHANGE UP (ref 1.6–2.6)
MAGNESIUM SERPL-MCNC: 2.8 MG/DL — HIGH (ref 1.6–2.6)
MCHC RBC-ENTMCNC: 33.1 PG — SIGNIFICANT CHANGE UP (ref 27–34)
MCHC RBC-ENTMCNC: 34.8 GM/DL — SIGNIFICANT CHANGE UP (ref 32–36)
MCV RBC AUTO: 95.1 FL — SIGNIFICANT CHANGE UP (ref 80–100)
PCO2 BLDV: 36 MMHG — SIGNIFICANT CHANGE UP (ref 35–50)
PH BLDV: 7.43 — SIGNIFICANT CHANGE UP (ref 7.35–7.45)
PHOSPHATE SERPL-MCNC: 1.8 MG/DL — LOW (ref 2.5–4.5)
PLATELET # BLD AUTO: 136 K/UL — LOW (ref 150–400)
PO2 BLDV: 34 MMHG — SIGNIFICANT CHANGE UP (ref 25–45)
PO2 BLDV: 35 MMHG — SIGNIFICANT CHANGE UP (ref 25–45)
PO2 BLDV: 36 MMHG — SIGNIFICANT CHANGE UP (ref 25–45)
POTASSIUM SERPL-MCNC: 4.2 MMOL/L — SIGNIFICANT CHANGE UP (ref 3.5–5.3)
POTASSIUM SERPL-MCNC: 5.1 MMOL/L — SIGNIFICANT CHANGE UP (ref 3.5–5.3)
POTASSIUM SERPL-SCNC: 4.2 MMOL/L — SIGNIFICANT CHANGE UP (ref 3.5–5.3)
POTASSIUM SERPL-SCNC: 5.1 MMOL/L — SIGNIFICANT CHANGE UP (ref 3.5–5.3)
PROT SERPL-MCNC: 5.4 G/DL — LOW (ref 6–8.3)
RBC # BLD: 3.09 M/UL — LOW (ref 4.2–5.8)
RBC # FLD: 12.5 % — SIGNIFICANT CHANGE UP (ref 10.3–14.5)
SAO2 % BLDV: 61 % — LOW (ref 67–88)
SAO2 % BLDV: 62 % — LOW (ref 67–88)
SAO2 % BLDV: 64 % — LOW (ref 67–88)
SODIUM SERPL-SCNC: 139 MMOL/L — SIGNIFICANT CHANGE UP (ref 135–145)
SODIUM SERPL-SCNC: 140 MMOL/L — SIGNIFICANT CHANGE UP (ref 135–145)
WBC # BLD: 13.9 K/UL — HIGH (ref 3.8–10.5)
WBC # FLD AUTO: 13.9 K/UL — HIGH (ref 3.8–10.5)

## 2019-04-22 PROCEDURE — 71045 X-RAY EXAM CHEST 1 VIEW: CPT | Mod: 26

## 2019-04-22 PROCEDURE — 99291 CRITICAL CARE FIRST HOUR: CPT

## 2019-04-22 RX ORDER — MAGNESIUM SULFATE 500 MG/ML
2 VIAL (ML) INJECTION ONCE
Qty: 0 | Refills: 0 | Status: COMPLETED | OUTPATIENT
Start: 2019-04-22 | End: 2019-04-22

## 2019-04-22 RX ORDER — POTASSIUM PHOSPHATE, MONOBASIC POTASSIUM PHOSPHATE, DIBASIC 236; 224 MG/ML; MG/ML
15 INJECTION, SOLUTION INTRAVENOUS ONCE
Qty: 0 | Refills: 0 | Status: COMPLETED | OUTPATIENT
Start: 2019-04-22 | End: 2019-04-22

## 2019-04-22 RX ORDER — HYDRALAZINE HCL 50 MG
100 TABLET ORAL EVERY 8 HOURS
Qty: 0 | Refills: 0 | Status: DISCONTINUED | OUTPATIENT
Start: 2019-04-22 | End: 2019-04-25

## 2019-04-22 RX ORDER — METOPROLOL TARTRATE 50 MG
12.5 TABLET ORAL
Qty: 0 | Refills: 0 | Status: DISCONTINUED | OUTPATIENT
Start: 2019-04-22 | End: 2019-04-23

## 2019-04-22 RX ORDER — POTASSIUM CHLORIDE 20 MEQ
10 PACKET (EA) ORAL
Qty: 0 | Refills: 0 | Status: COMPLETED | OUTPATIENT
Start: 2019-04-22 | End: 2019-04-22

## 2019-04-22 RX ORDER — HYDRALAZINE HCL 50 MG
10 TABLET ORAL ONCE
Qty: 0 | Refills: 0 | Status: COMPLETED | OUTPATIENT
Start: 2019-04-22 | End: 2019-04-22

## 2019-04-22 RX ORDER — POTASSIUM CHLORIDE 20 MEQ
20 PACKET (EA) ORAL ONCE
Qty: 0 | Refills: 0 | Status: COMPLETED | OUTPATIENT
Start: 2019-04-22 | End: 2019-04-22

## 2019-04-22 RX ADMIN — Medication 20 MILLIEQUIVALENT(S): at 14:36

## 2019-04-22 RX ADMIN — Medication 12.5 MILLIGRAM(S): at 18:18

## 2019-04-22 RX ADMIN — Medication 100 MILLIGRAM(S): at 05:45

## 2019-04-22 RX ADMIN — Medication 50 MILLIEQUIVALENT(S): at 01:08

## 2019-04-22 RX ADMIN — Medication 325 MILLIGRAM(S): at 09:45

## 2019-04-22 RX ADMIN — CLOPIDOGREL BISULFATE 75 MILLIGRAM(S): 75 TABLET, FILM COATED ORAL at 13:10

## 2019-04-22 RX ADMIN — Medication 100 MILLIGRAM(S): at 21:38

## 2019-04-22 RX ADMIN — Medication 75 MILLIGRAM(S): at 05:45

## 2019-04-22 RX ADMIN — Medication 50 GRAM(S): at 09:56

## 2019-04-22 RX ADMIN — PANTOPRAZOLE SODIUM 40 MILLIGRAM(S): 20 TABLET, DELAYED RELEASE ORAL at 08:51

## 2019-04-22 RX ADMIN — Medication 100 MILLIGRAM(S): at 13:10

## 2019-04-22 RX ADMIN — Medication 0.25 MILLIGRAM(S): at 05:45

## 2019-04-22 RX ADMIN — Medication 325 MILLIGRAM(S): at 05:45

## 2019-04-22 RX ADMIN — ATORVASTATIN CALCIUM 40 MILLIGRAM(S): 80 TABLET, FILM COATED ORAL at 21:38

## 2019-04-22 RX ADMIN — Medication 20 MILLIEQUIVALENT(S): at 09:56

## 2019-04-22 RX ADMIN — Medication 325 MILLIGRAM(S): at 06:15

## 2019-04-22 RX ADMIN — ENOXAPARIN SODIUM 40 MILLIGRAM(S): 100 INJECTION SUBCUTANEOUS at 13:10

## 2019-04-22 RX ADMIN — LOSARTAN POTASSIUM 100 MILLIGRAM(S): 100 TABLET, FILM COATED ORAL at 05:45

## 2019-04-22 RX ADMIN — POTASSIUM PHOSPHATE, MONOBASIC POTASSIUM PHOSPHATE, DIBASIC 62.5 MILLIMOLE(S): 236; 224 INJECTION, SOLUTION INTRAVENOUS at 03:02

## 2019-04-22 RX ADMIN — Medication 10 MILLIGRAM(S): at 08:33

## 2019-04-22 RX ADMIN — Medication 325 MILLIGRAM(S): at 09:15

## 2019-04-22 RX ADMIN — Medication 0.25 MILLIGRAM(S): at 13:10

## 2019-04-22 RX ADMIN — Medication 0.25 MILLIGRAM(S): at 21:38

## 2019-04-22 RX ADMIN — Medication 325 MILLIGRAM(S): at 13:10

## 2019-04-22 RX ADMIN — Medication 325 MILLIGRAM(S): at 21:38

## 2019-04-22 RX ADMIN — Medication 50 MILLIEQUIVALENT(S): at 04:00

## 2019-04-22 RX ADMIN — Medication 325 MILLIGRAM(S): at 22:08

## 2019-04-22 NOTE — PROGRESS NOTE ADULT - SUBJECTIVE AND OBJECTIVE BOX
VITAL SIGNS    Telemetry:    Vital Signs Last 24 Hrs  T(C): 36.9 (04-22-19 @ 15:39), Max: 37.8 (04-22-19 @ 04:00)  T(F): 98.5 (04-22-19 @ 15:39), Max: 100 (04-22-19 @ 04:00)  HR: 75 (04-22-19 @ 15:39) (75 - 91)  BP: 130/64 (04-22-19 @ 15:39) (117/59 - 147/77)  RR: 20 (04-22-19 @ 15:39) (16 - 33)  SpO2: 96% (04-22-19 @ 15:39) (93% - 98%)                       10.2<L>                139  | 20<L>| 27<H>        13.9<H> >-----------< 136<L>   ------------------------< 105<H>                 29.4<L>                4.2  | 108  | 1.17                                                                      Ca 7.9<L> Mg 2.6   Ph 1.8<L>    ,             10.5<L>                134<L>| 19<L>| 22           21.4<H> >-----------< 94<L>   ------------------------< 121<H>                 30.3<L>                4.4  | 105  | 1.09                                                                      Ca 8.3<L> Mg 2.7<H> Ph 2.4<L>            04-21-19 @ 07:01  -  04-22-19 @ 07:00  --------------------------------------------------------  IN: 554.9 mL / OUT: 2705 mL / NET: -2150.1 mL    04-22-19 @ 07:01  -  04-22-19 @ 16:45  --------------------------------------------------------  IN: 400 mL / OUT: 750 mL / NET: -350 mL        Daily     Daily         CAPILLARY BLOOD GLUCOSE  142 (22 Apr 2019 13:00)  89 (22 Apr 2019 09:00)      POCT Blood Glucose.: 107 mg/dL (22 Apr 2019 16:25)                Coumadin    [ ] YES          [  ]      NO                                   PHYSICAL EXAM        Neurology: alert and oriented x 3, nonfocal, no gross deficits  CV : .S1S2 RRR  Sternal Wound :  CDI , Stable  Pacing Wires        [  ]   Settings:                                  Isolated  [  ]  Lungs: bibasilar crackles   Drains:     MS         [  ] Drainage:                 L Pleural  [  ]  Drainage:                R Pleural  [  ]  Drainage:  Abdomen: soft, nontender, nondistended, positive bowel sounds, last bowel movement   :         voiding    Extremities:     __ edema, ___calf tenderness.                            __svg site cdi          acetaminophen   Tablet .. 325 milliGRAM(s) Oral every 4 hours  ALPRAZolam 0.25 milliGRAM(s) Oral every 8 hours  amLODIPine   Tablet 10 milliGRAM(s) Oral daily  aspirin enteric coated 325 milliGRAM(s) Oral daily  atorvastatin 40 milliGRAM(s) Oral at bedtime  clopidogrel Tablet 75 milliGRAM(s) Oral daily  docusate sodium 100 milliGRAM(s) Oral three times a day  enoxaparin Injectable 40 milliGRAM(s) SubCutaneous daily  hydrALAZINE 100 milliGRAM(s) Oral every 8 hours  insulin lispro (HumaLOG) corrective regimen sliding scale   SubCutaneous three times a day before meals  insulin lispro (HumaLOG) corrective regimen sliding scale   SubCutaneous at bedtime  losartan 100 milliGRAM(s) Oral daily  oxyCODONE    IR 5 milliGRAM(s) Oral every 4 hours PRN  oxyCODONE    IR 10 milliGRAM(s) Oral every 4 hours PRN  pantoprazole    Tablet 40 milliGRAM(s) Oral before breakfast  sodium chloride 0.9%. 1000 milliLiter(s) IV Continuous <Continuous>                    Physical Therapy Rec:   Home  [  ]   Home w/ PT  [  ]  Rehab  [  ]  Discussed with Cardiothoracic Team at AM rounds. hello    VITAL SIGNS    Telemetry:  nsr 80  Vital Signs Last 24 Hrs  T(C): 36.9 (04-22-19 @ 15:39), Max: 37.8 (04-22-19 @ 04:00)  T(F): 98.5 (04-22-19 @ 15:39), Max: 100 (04-22-19 @ 04:00)  HR: 75 (04-22-19 @ 15:39) (75 - 91)  BP: 130/64 (04-22-19 @ 15:39) (117/59 - 147/77)  RR: 20 (04-22-19 @ 15:39) (16 - 33)  SpO2: 96% (04-22-19 @ 15:39) (93% - 98%)                       10.2<L>                139  | 20<L>| 27<H>        13.9<H> >-----------< 136<L>   ------------------------< 105<H>                 29.4<L>                4.2  | 108  | 1.17                                                                      Ca 7.9<L> Mg 2.6   Ph 1.8<L>    ,             10.5<L>                134<L>| 19<L>| 22           21.4<H> >-----------< 94<L>   ------------------------< 121<H>                 30.3<L>                4.4  | 105  | 1.09                                                                      Ca 8.3<L> Mg 2.7<H> Ph 2.4<L>            04-21-19 @ 07:01  -  04-22-19 @ 07:00  --------------------------------------------------------  IN: 554.9 mL / OUT: 2705 mL / NET: -2150.1 mL    04-22-19 @ 07:01  -  04-22-19 @ 16:45  --------------------------------------------------------  IN: 400 mL / OUT: 750 mL / NET: -350 mL        Daily     Daily         CAPILLARY BLOOD GLUCOSE  142 (22 Apr 2019 13:00)  89 (22 Apr 2019 09:00)      POCT Blood Glucose.: 107 mg/dL (22 Apr 2019 16:25)                Coumadin    [ ] YES          [x  ]      NO                                   PHYSICAL EXAM        Neurology: alert and oriented x 3, nonfocal, no gross deficits  CV : .S1S2 RRR  Sternal Wound :  CDI , Stable  Pacing Wires        [ x ]   Settings:             vvi                     Isolated  [  ]  Lungs: bibasilar crackles   Drains:     MS         [  ] Drainage:                 L Pleural  [  ]  Drainage:                R Pleural  [  ]  Drainage:  Abdomen: soft, nontender, nondistended, positive bowel sounds, last bowel movement   :         voiding    Extremities:     _trace_ edema, __-_calf tenderness.                            R__svg site cdi          acetaminophen   Tablet .. 325 milliGRAM(s) Oral every 4 hours  ALPRAZolam 0.25 milliGRAM(s) Oral every 8 hours  amLODIPine   Tablet 10 milliGRAM(s) Oral daily  aspirin enteric coated 325 milliGRAM(s) Oral daily  atorvastatin 40 milliGRAM(s) Oral at bedtime  clopidogrel Tablet 75 milliGRAM(s) Oral daily  docusate sodium 100 milliGRAM(s) Oral three times a day  enoxaparin Injectable 40 milliGRAM(s) SubCutaneous daily  hydrALAZINE 100 milliGRAM(s) Oral every 8 hours  insulin lispro (HumaLOG) corrective regimen sliding scale   SubCutaneous three times a day before meals  insulin lispro (HumaLOG) corrective regimen sliding scale   SubCutaneous at bedtime  losartan 100 milliGRAM(s) Oral daily  oxyCODONE    IR 5 milliGRAM(s) Oral every 4 hours PRN  oxyCODONE    IR 10 milliGRAM(s) Oral every 4 hours PRN  pantoprazole    Tablet 40 milliGRAM(s) Oral before breakfast  sodium chloride 0.9%. 1000 milliLiter(s) IV Continuous <Continuous>                    Physical Therapy Rec:   Home  [  ]   Home w/ PT  [  ]  Rehab  [  ]  Discussed with Cardiothoracic Team at AM rounds.

## 2019-04-22 NOTE — PROGRESS NOTE ADULT - ASSESSMENT
74M PMH GERD, HTN BIBEMS, transferred from [facility] after presenting initially with chest pain. Pt states he's had midsternal/epigastric chest pain, rated 9/10 at worst, non-radiating, worst w rest, alleviated by ambulating, associated w nausea and gas. He has had these symptoms for months however pt symptoms worsening in frequency and thus he was brought to the hospital by his daughter whom he has been visiting since 2/2018 from [facility]. Pt found to have EKG, elevated trop and Pro-bnp (>6000), ASA, Brilinta, Hep loaded and transferred to [facility] for further management.   On 4/15 underwent cardiac cath findings revealed multi vessel CAD; IABP placed and transferred to CCU.  CTS consult called to evaluate patient for cardiac surgery.   ·  PROCEDURES:  18-Apr-2019 CABG x 4 LIMA-LAD, SVG-PL, SVG-OM-OM, IABP, ef 20  post op CABG complicated with cardiogenic shock, lactic acidosis & severe hyperglycemia  SR, back up pacing in place, monitor for post op bleeding, ++ chest tube outputs.   Transfuse products, PRBC cont to monitor serial lactate     Dobutamine gtt,  Pressors to maintain MAP > 70 f/u perfusion indices, volume resuscitation  R leg IAbP with good diastolic augmentation  Glycemic control, INS gtt  Extubated d 1, IABP d/c 74M PMH GERD, HTN BIBEMS, transferred from [facility] after presenting initially with chest pain. Pt states he's had midsternal/epigastric chest pain, rated 9/10 at worst, non-radiating, worst w rest, alleviated by ambulating, associated w nausea and gas. He has had these symptoms for months however pt symptoms worsening in frequency and thus he was brought to the hospital by his daughter whom he has been visiting since 2/2018 from [facility]. Pt found to have EKG, elevated trop and Pro-bnp (>6000), ASA, Brilinta, Hep loaded and transferred to [facility] for further management.   On 4/15 underwent cardiac cath findings revealed multi vessel CAD; IABP placed and transferred to CCU.  CTS consult called to evaluate patient for cardiac surgery.   ·  PROCEDURES:  18-Apr-2019 CABG x 4 LIMA-LAD, SVG-PL, SVG-OM-OM, IABP, ef 20  post op CABG complicated with cardiogenic shock, lactic acidosis & severe hyperglycemia  SR, back up pacing in place, monitor for post op bleeding, ++ chest tube outputs.   Transfuse products, PRBC cont to monitor serial lactate     Dobutamine gtt,  Pressors to maintain MAP > 70 f/u perfusion indices, volume resuscitation  R leg IAbP with good diastolic augmentation  Glycemic control, INS gtt  Extubated d 1, IABP d/c  Transferred to sdu

## 2019-04-22 NOTE — PROGRESS NOTE ADULT - SUBJECTIVE AND OBJECTIVE BOX
CRITICAL CARE ATTENDING - CTICU    MEDICATIONS  (STANDING):  acetaminophen   Tablet .. 325 milliGRAM(s) Oral every 4 hours  ALPRAZolam 0.25 milliGRAM(s) Oral every 8 hours  amLODIPine   Tablet 10 milliGRAM(s) Oral daily  aspirin enteric coated 325 milliGRAM(s) Oral daily  atorvastatin 40 milliGRAM(s) Oral at bedtime  clopidogrel Tablet 75 milliGRAM(s) Oral daily  docusate sodium 100 milliGRAM(s) Oral three times a day  enoxaparin Injectable 40 milliGRAM(s) SubCutaneous daily  hydrALAZINE 100 milliGRAM(s) Oral every 8 hours  insulin lispro (HumaLOG) corrective regimen sliding scale   SubCutaneous three times a day before meals  insulin lispro (HumaLOG) corrective regimen sliding scale   SubCutaneous at bedtime  losartan 100 milliGRAM(s) Oral daily  pantoprazole    Tablet 40 milliGRAM(s) Oral before breakfast  sodium chloride 0.9%. 1000 milliLiter(s) (10 mL/Hr) IV Continuous <Continuous>                                    10.2   13.9  )-----------( 136      ( 22 Apr 2019 01:09 )             29.4       04-22    139  |  108  |  27<H>  ----------------------------<  105<H>  4.2   |  20<L>  |  1.17    Ca    7.9<L>      22 Apr 2019 01:09  Phos  1.8     04-22  Mg     2.6     04-22    TPro  5.4<L>  /  Alb  3.3  /  TBili  1.0  /  DBili  x   /  AST  66<H>  /  ALT  47<H>  /  AlkPhos  92  04-22              Daily     Daily       04-21 @ 07:01 - 04-22 @ 07:00  --------------------------------------------------------  IN: 554.9 mL / OUT: 2705 mL / NET: -2150.1 mL    04-22 @ 07:01  - 04-22 @ 11:48  --------------------------------------------------------  IN: 390 mL / OUT: 0 mL / NET: 390 mL        Critically Ill patient  : [ ] preoperative ,   [x ] post operative    Requires :  [ x] Arterial Line   [ x] Central Line  [ ] PA catheter  [ ] IABP  [ ] ECMO  [ ] LVAD  [ ] Ventilator  [x ] pacemaker [ ] Impella.    Bedside evaluation , monitoring , treatment of hemodynamics , fluids , IVP/ IVCD meds.        Diagnosis:     POD 4/18 - CABG X4 L    Cardiogenic Shock - resolved     CHF- acute [x ]   chronic [x    systolic [x]   diatolic [ ]          - Echo- EF -   20%          [x ] RV dysfunction          - Cxr-cardiomegally, edema          - Clinical-  [ x]inotropes   [ ]pressors   [x ]diuresis   [ ]IABP   [ ]ECMO   [ ]LVAD   [ ]Respiratory Failure    Hypertension    Hemodynamic lability,instability. Requires IVCD [ ] vasopressors [x ] inotropes  on / off primacor  [ ] vasodilator  [ ]IVSS fluid  to maintain MAP, perfusion, C.I.     Temporary pacemaker (TPM) interrogation and setting.     Prerenal Azotemia     Requires bedside physical therapy, mobilization and total long term care.     TTE today    resolving lactic acidosis                            -                     Discussed with CT surgeon, Physician's Assistant - Nurse Practitioner- Critical care medicine team.   Dicussed at  AM / PM rounds.   Chart, labs , films reviewed.    Total Time: 30 min

## 2019-04-23 DIAGNOSIS — Z95.1 PRESENCE OF AORTOCORONARY BYPASS GRAFT: ICD-10-CM

## 2019-04-23 LAB
ANION GAP SERPL CALC-SCNC: 11 MMOL/L — SIGNIFICANT CHANGE UP (ref 5–17)
BUN SERPL-MCNC: 19 MG/DL — SIGNIFICANT CHANGE UP (ref 7–23)
CALCIUM SERPL-MCNC: 8.3 MG/DL — LOW (ref 8.4–10.5)
CHLORIDE SERPL-SCNC: 107 MMOL/L — SIGNIFICANT CHANGE UP (ref 96–108)
CO2 SERPL-SCNC: 22 MMOL/L — SIGNIFICANT CHANGE UP (ref 22–31)
CREAT SERPL-MCNC: 1.16 MG/DL — SIGNIFICANT CHANGE UP (ref 0.5–1.3)
GLUCOSE BLDC GLUCOMTR-MCNC: 129 MG/DL — HIGH (ref 70–99)
GLUCOSE BLDC GLUCOMTR-MCNC: 86 MG/DL — SIGNIFICANT CHANGE UP (ref 70–99)
GLUCOSE BLDC GLUCOMTR-MCNC: 91 MG/DL — SIGNIFICANT CHANGE UP (ref 70–99)
GLUCOSE BLDC GLUCOMTR-MCNC: 97 MG/DL — SIGNIFICANT CHANGE UP (ref 70–99)
GLUCOSE SERPL-MCNC: 96 MG/DL — SIGNIFICANT CHANGE UP (ref 70–99)
HCT VFR BLD CALC: 33.1 % — LOW (ref 39–50)
HGB BLD-MCNC: 10.9 G/DL — LOW (ref 13–17)
MCHC RBC-ENTMCNC: 32.5 PG — SIGNIFICANT CHANGE UP (ref 27–34)
MCHC RBC-ENTMCNC: 33.1 GM/DL — SIGNIFICANT CHANGE UP (ref 32–36)
MCV RBC AUTO: 98.2 FL — SIGNIFICANT CHANGE UP (ref 80–100)
PLATELET # BLD AUTO: 184 K/UL — SIGNIFICANT CHANGE UP (ref 150–400)
POTASSIUM SERPL-MCNC: 4.4 MMOL/L — SIGNIFICANT CHANGE UP (ref 3.5–5.3)
POTASSIUM SERPL-SCNC: 4.4 MMOL/L — SIGNIFICANT CHANGE UP (ref 3.5–5.3)
RBC # BLD: 3.37 M/UL — LOW (ref 4.2–5.8)
RBC # FLD: 13.2 % — SIGNIFICANT CHANGE UP (ref 10.3–14.5)
SODIUM SERPL-SCNC: 140 MMOL/L — SIGNIFICANT CHANGE UP (ref 135–145)
WBC # BLD: 7.8 K/UL — SIGNIFICANT CHANGE UP (ref 3.8–10.5)
WBC # FLD AUTO: 7.8 K/UL — SIGNIFICANT CHANGE UP (ref 3.8–10.5)

## 2019-04-23 RX ORDER — METOPROLOL TARTRATE 50 MG
12.5 TABLET ORAL ONCE
Qty: 0 | Refills: 0 | Status: COMPLETED | OUTPATIENT
Start: 2019-04-23 | End: 2019-04-23

## 2019-04-23 RX ORDER — METOPROLOL TARTRATE 50 MG
25 TABLET ORAL
Qty: 0 | Refills: 0 | Status: DISCONTINUED | OUTPATIENT
Start: 2019-04-23 | End: 2019-04-25

## 2019-04-23 RX ORDER — POLYETHYLENE GLYCOL 3350 17 G/17G
17 POWDER, FOR SOLUTION ORAL DAILY
Qty: 0 | Refills: 0 | Status: DISCONTINUED | OUTPATIENT
Start: 2019-04-23 | End: 2019-04-25

## 2019-04-23 RX ADMIN — Medication 100 MILLIGRAM(S): at 14:20

## 2019-04-23 RX ADMIN — Medication 100 MILLIGRAM(S): at 06:08

## 2019-04-23 RX ADMIN — PANTOPRAZOLE SODIUM 40 MILLIGRAM(S): 20 TABLET, DELAYED RELEASE ORAL at 06:08

## 2019-04-23 RX ADMIN — Medication 0.25 MILLIGRAM(S): at 06:11

## 2019-04-23 RX ADMIN — Medication 0.25 MILLIGRAM(S): at 14:20

## 2019-04-23 RX ADMIN — Medication 100 MILLIGRAM(S): at 22:01

## 2019-04-23 RX ADMIN — AMLODIPINE BESYLATE 10 MILLIGRAM(S): 2.5 TABLET ORAL at 06:08

## 2019-04-23 RX ADMIN — Medication 12.5 MILLIGRAM(S): at 10:08

## 2019-04-23 RX ADMIN — Medication 12.5 MILLIGRAM(S): at 06:08

## 2019-04-23 RX ADMIN — CLOPIDOGREL BISULFATE 75 MILLIGRAM(S): 75 TABLET, FILM COATED ORAL at 11:18

## 2019-04-23 RX ADMIN — Medication 25 MILLIGRAM(S): at 17:03

## 2019-04-23 RX ADMIN — Medication 325 MILLIGRAM(S): at 11:18

## 2019-04-23 RX ADMIN — Medication 100 MILLIGRAM(S): at 22:00

## 2019-04-23 RX ADMIN — ENOXAPARIN SODIUM 40 MILLIGRAM(S): 100 INJECTION SUBCUTANEOUS at 11:18

## 2019-04-23 RX ADMIN — Medication 325 MILLIGRAM(S): at 06:38

## 2019-04-23 RX ADMIN — Medication 325 MILLIGRAM(S): at 06:08

## 2019-04-23 RX ADMIN — POLYETHYLENE GLYCOL 3350 17 GRAM(S): 17 POWDER, FOR SOLUTION ORAL at 11:18

## 2019-04-23 RX ADMIN — ATORVASTATIN CALCIUM 40 MILLIGRAM(S): 80 TABLET, FILM COATED ORAL at 22:00

## 2019-04-23 RX ADMIN — LOSARTAN POTASSIUM 100 MILLIGRAM(S): 100 TABLET, FILM COATED ORAL at 06:08

## 2019-04-23 RX ADMIN — Medication 325 MILLIGRAM(S): at 22:34

## 2019-04-23 RX ADMIN — Medication 0.25 MILLIGRAM(S): at 22:00

## 2019-04-23 RX ADMIN — Medication 325 MILLIGRAM(S): at 22:01

## 2019-04-23 NOTE — PROGRESS NOTE ADULT - SUBJECTIVE AND OBJECTIVE BOX
Marty    VITAL SIGNS    Telemetry:  nsr 78  Vital Signs Last 24 Hrs  T(C): 36.8 (19 @ 07:10), Max: 37 (19 @ 19:08)  T(F): 98.3 (19 @ 07:10), Max: 98.6 (19 @ 19:08)  HR: 68 (19 07:10) (65 - 91)  BP: 143/73 (19 07:10) (117/59 - 143/73)  RR: 18 (19 07:10) (16 - 28)  SpO2: 97% (19 07:10) (93% - 98%)                       10.9<L>                140  | 22   | 19           7.8   >-----------< 184     ------------------------< 96                    33.1<L>                4.4  | 107  | 1.16                                                                      Ca 8.3<L> Mg x     Ph x        ,             x                    140  | 21<L>| 22           x     >-----------< x       ------------------------< 112<H>                 x                    5.1  | 107  | 1.10                                                                      Ca 8.3<L> Mg 2.8<H> Ph x                19 @ 07:01  -  19 @ 07:00  --------------------------------------------------------  IN: 600 mL / OUT: 1675 mL / NET: -1075 mL    19 @ 07:01  -  19 @ 09:53  --------------------------------------------------------  IN: 240 mL / OUT: 0 mL / NET: 240 mL        Daily     Daily Weight in k.5 (2019 06:32)        CAPILLARY BLOOD GLUCOSE  142 (2019 13:00)      POCT Blood Glucose.: 91 mg/dL (2019 07:48)  POCT Blood Glucose.: 98 mg/dL (2019 21:37)  POCT Blood Glucose.: 107 mg/dL (2019 16:25)                Coumadin    [ ] YES          [x  ]      NO                                   PHYSICAL EXAM      Serbian speaking  Neurology: alert and oriented x 3, nonfocal, no gross deficits  CV : .S1S2 RRR  Sternal Wound :  CDI , Stable  Pacing Wires        [  ]   Settings:                                  Isolated  [ x ]  Lungs: ctsa  Drains:     MS         [  ] Drainage:                 L Pleural  [  ]  Drainage:                R Pleural  [  ]  Drainage:  Abdomen: soft, nontender, nondistended, positive bowel sounds, last bowel movement neg  :         voiding    Extremities:     -__ edema, __-_calf tenderness.                            _r_svg site cdi          acetaminophen   Tablet .. 325 milliGRAM(s) Oral every 4 hours  ALPRAZolam 0.25 milliGRAM(s) Oral every 8 hours  amLODIPine   Tablet 10 milliGRAM(s) Oral daily  aspirin enteric coated 325 milliGRAM(s) Oral daily  atorvastatin 40 milliGRAM(s) Oral at bedtime  bisacodyl Suppository 10 milliGRAM(s) Rectal daily PRN  clopidogrel Tablet 75 milliGRAM(s) Oral daily  docusate sodium 100 milliGRAM(s) Oral three times a day  enoxaparin Injectable 40 milliGRAM(s) SubCutaneous daily  hydrALAZINE 100 milliGRAM(s) Oral every 8 hours  insulin lispro (HumaLOG) corrective regimen sliding scale   SubCutaneous three times a day before meals  insulin lispro (HumaLOG) corrective regimen sliding scale   SubCutaneous at bedtime  losartan 100 milliGRAM(s) Oral daily  metoprolol tartrate 25 milliGRAM(s) Oral two times a day  oxyCODONE    IR 5 milliGRAM(s) Oral every 4 hours PRN  oxyCODONE    IR 10 milliGRAM(s) Oral every 4 hours PRN  pantoprazole    Tablet 40 milliGRAM(s) Oral before breakfast  polyethylene glycol 3350 17 Gram(s) Oral daily  sodium chloride 0.9%. 1000 milliLiter(s) IV Continuous <Continuous>                    Physical Therapy Rec:   Home  [  ]   Home w/ PT  [  ]  Rehab  [  ]  Discussed with Cardiothoracic Team at AM rounds.

## 2019-04-23 NOTE — PROGRESS NOTE ADULT - ASSESSMENT
74M PMH GERD, HTN BIBEMS, transferred from [facility] after presenting initially with chest pain. Pt states he's had midsternal/epigastric chest pain, rated 9/10 at worst, non-radiating, worst w rest, alleviated by ambulating, associated w nausea and gas. He has had these symptoms for months however pt symptoms worsening in frequency and thus he was brought to the hospital by his daughter whom he has been visiting since 2/2018 from [facility]. Pt found to have EKG, elevated trop and Pro-bnp (>6000), ASA, Brilinta, Hep loaded and transferred to [facility] for further management.   On 4/15 underwent cardiac cath findings revealed multi vessel CAD; IABP placed and transferred to CCU.  CTS consult called to evaluate patient for cardiac surgery.   ·  PROCEDURES:  18-Apr-2019 CABG x 4 LIMA-LAD, SVG-PL, SVG-OM-OM, IABP, ef 20  post op CABG complicated with cardiogenic shock, lactic acidosis & severe hyperglycemia  SR, back up pacing in place, monitor for post op bleeding, ++ chest tube outputs.   Transfuse products, PRBC cont to monitor serial lactate     Dobutamine gtt,  Pressors to maintain MAP > 70 f/u perfusion indices, volume resuscitation  R leg IAbP with good diastolic augmentation  Glycemic control, INS gtt  Extubated d 1, IABP d/c  Transferred to sdu  4/23 increase lopressor  May transfer to floor  D/c Planning wed/thursday.

## 2019-04-24 LAB
ANION GAP SERPL CALC-SCNC: 9 MMOL/L — SIGNIFICANT CHANGE UP (ref 5–17)
BUN SERPL-MCNC: 16 MG/DL — SIGNIFICANT CHANGE UP (ref 7–23)
CALCIUM SERPL-MCNC: 8.7 MG/DL — SIGNIFICANT CHANGE UP (ref 8.4–10.5)
CHLORIDE SERPL-SCNC: 103 MMOL/L — SIGNIFICANT CHANGE UP (ref 96–108)
CO2 SERPL-SCNC: 24 MMOL/L — SIGNIFICANT CHANGE UP (ref 22–31)
CREAT SERPL-MCNC: 1.11 MG/DL — SIGNIFICANT CHANGE UP (ref 0.5–1.3)
GLUCOSE BLDC GLUCOMTR-MCNC: 140 MG/DL — HIGH (ref 70–99)
GLUCOSE BLDC GLUCOMTR-MCNC: 82 MG/DL — SIGNIFICANT CHANGE UP (ref 70–99)
GLUCOSE SERPL-MCNC: 77 MG/DL — SIGNIFICANT CHANGE UP (ref 70–99)
HCT VFR BLD CALC: 33.2 % — LOW (ref 39–50)
HGB BLD-MCNC: 11.2 G/DL — LOW (ref 13–17)
MCHC RBC-ENTMCNC: 33.2 PG — SIGNIFICANT CHANGE UP (ref 27–34)
MCHC RBC-ENTMCNC: 33.8 GM/DL — SIGNIFICANT CHANGE UP (ref 32–36)
MCV RBC AUTO: 98.2 FL — SIGNIFICANT CHANGE UP (ref 80–100)
PLATELET # BLD AUTO: 252 K/UL — SIGNIFICANT CHANGE UP (ref 150–400)
POTASSIUM SERPL-MCNC: 4.4 MMOL/L — SIGNIFICANT CHANGE UP (ref 3.5–5.3)
POTASSIUM SERPL-SCNC: 4.4 MMOL/L — SIGNIFICANT CHANGE UP (ref 3.5–5.3)
RBC # BLD: 3.38 M/UL — LOW (ref 4.2–5.8)
RBC # FLD: 13.7 % — SIGNIFICANT CHANGE UP (ref 10.3–14.5)
SODIUM SERPL-SCNC: 136 MMOL/L — SIGNIFICANT CHANGE UP (ref 135–145)
WBC # BLD: 9.7 K/UL — SIGNIFICANT CHANGE UP (ref 3.8–10.5)
WBC # FLD AUTO: 9.7 K/UL — SIGNIFICANT CHANGE UP (ref 3.8–10.5)

## 2019-04-24 RX ADMIN — AMLODIPINE BESYLATE 10 MILLIGRAM(S): 2.5 TABLET ORAL at 06:19

## 2019-04-24 RX ADMIN — Medication 325 MILLIGRAM(S): at 12:20

## 2019-04-24 RX ADMIN — PANTOPRAZOLE SODIUM 40 MILLIGRAM(S): 20 TABLET, DELAYED RELEASE ORAL at 06:19

## 2019-04-24 RX ADMIN — Medication 100 MILLIGRAM(S): at 21:20

## 2019-04-24 RX ADMIN — Medication 25 MILLIGRAM(S): at 18:04

## 2019-04-24 RX ADMIN — Medication 25 MILLIGRAM(S): at 06:19

## 2019-04-24 RX ADMIN — ATORVASTATIN CALCIUM 40 MILLIGRAM(S): 80 TABLET, FILM COATED ORAL at 21:20

## 2019-04-24 RX ADMIN — Medication 100 MILLIGRAM(S): at 06:19

## 2019-04-24 RX ADMIN — LOSARTAN POTASSIUM 100 MILLIGRAM(S): 100 TABLET, FILM COATED ORAL at 06:19

## 2019-04-24 RX ADMIN — Medication 100 MILLIGRAM(S): at 13:33

## 2019-04-24 RX ADMIN — ENOXAPARIN SODIUM 40 MILLIGRAM(S): 100 INJECTION SUBCUTANEOUS at 12:20

## 2019-04-24 RX ADMIN — Medication 0.25 MILLIGRAM(S): at 06:19

## 2019-04-24 RX ADMIN — CLOPIDOGREL BISULFATE 75 MILLIGRAM(S): 75 TABLET, FILM COATED ORAL at 12:20

## 2019-04-24 NOTE — PROGRESS NOTE ADULT - PROBLEM SELECTOR PROBLEM 1
S/P CABG x 4
S/P CABG x 4
NSTEMI (non-ST elevated myocardial infarction)

## 2019-04-24 NOTE — PROGRESS NOTE ADULT - PROVIDER SPECIALTY LIST ADULT
CCU
CT Surgery
Critical Care
CT Surgery
CT Surgery

## 2019-04-24 NOTE — PROGRESS NOTE ADULT - SUBJECTIVE AND OBJECTIVE BOX
VITAL SIGNS    Telemetry:  nsr  Vital Signs Last 24 Hrs  T(C): 36.7 (04-24-19 @ 07:01), Max: 37 (04-23-19 @ 23:23)  T(F): 98 (04-24-19 @ 07:01), Max: 98.6 (04-23-19 @ 23:23)  HR: 75 (04-24-19 @ 07:01) (65 - 75)  BP: 155/82 (04-24-19 @ 07:01) (116/55 - 157/83)  RR: 18 (04-24-19 @ 07:01) (18 - 18)  SpO2: 100% (04-24-19 @ 07:01) (95% - 100%)                       11.2<L>                136  | 24   | 16           9.7   >-----------< 252     ------------------------< 77                    33.2<L>                4.4  | 103  | 1.11                                                                      Ca 8.7   Mg x     Ph x        ,             10.9<L>                140  | 22   | 19           7.8   >-----------< 184     ------------------------< 96                    33.1<L>                4.4  | 107  | 1.16                                                                      Ca 8.3<L> Mg x     Ph x                04-23-19 @ 07:01  -  04-24-19 @ 07:00  --------------------------------------------------------  IN: 1140 mL / OUT: 2500 mL / NET: -1360 mL    04-24-19 @ 07:01  -  04-24-19 @ 10:52  --------------------------------------------------------  IN: 360 mL / OUT: 350 mL / NET: 10 mL        Daily     Daily         CAPILLARY BLOOD GLUCOSE      POCT Blood Glucose.: 82 mg/dL (24 Apr 2019 07:52)  POCT Blood Glucose.: 86 mg/dL (23 Apr 2019 21:39)  POCT Blood Glucose.: 97 mg/dL (23 Apr 2019 16:34)  POCT Blood Glucose.: 129 mg/dL (23 Apr 2019 11:51)                Coumadin    [ ] YES          [ x ]      NO                                   PHYSICAL EXAM        Neurology: alert and oriented x 3, nonfocal, no gross deficits  CV : .S1S2 RRR  Sternal Wound :  CDI , Stable  Pacing Wires        [  ]   Settings:                                  Isolated  [x  ]  Lungs: bibasilar crackles   Drains:     MS         [  ] Drainage:                 L Pleural  [  ]  Drainage:                R Pleural  [  ]  Drainage:  Abdomen: soft, nontender, nondistended, positive bowel sounds, last bowel movement +  :         voiding    Extremities:     _-_ edema, __-_calf tenderness.                           r __svg cdi          acetaminophen   Tablet .. 325 milliGRAM(s) Oral every 4 hours  ALPRAZolam 0.25 milliGRAM(s) Oral every 8 hours  amLODIPine   Tablet 10 milliGRAM(s) Oral daily  aspirin enteric coated 325 milliGRAM(s) Oral daily  atorvastatin 40 milliGRAM(s) Oral at bedtime  bisacodyl Suppository 10 milliGRAM(s) Rectal daily PRN  clopidogrel Tablet 75 milliGRAM(s) Oral daily  docusate sodium 100 milliGRAM(s) Oral three times a day  enoxaparin Injectable 40 milliGRAM(s) SubCutaneous daily  hydrALAZINE 100 milliGRAM(s) Oral every 8 hours  insulin lispro (HumaLOG) corrective regimen sliding scale   SubCutaneous three times a day before meals  insulin lispro (HumaLOG) corrective regimen sliding scale   SubCutaneous at bedtime  losartan 100 milliGRAM(s) Oral daily  metoprolol tartrate 25 milliGRAM(s) Oral two times a day  oxyCODONE    IR 5 milliGRAM(s) Oral every 4 hours PRN  oxyCODONE    IR 10 milliGRAM(s) Oral every 4 hours PRN  pantoprazole    Tablet 40 milliGRAM(s) Oral before breakfast  polyethylene glycol 3350 17 Gram(s) Oral daily  sodium chloride 0.9%. 1000 milliLiter(s) IV Continuous <Continuous>                    Physical Therapy Rec:   Home  [  ]   Home w/ PT  [  ]  Rehab  [  ]  Discussed with Cardiothoracic Team at AM rounds.

## 2019-04-24 NOTE — PROGRESS NOTE ADULT - ASSESSMENT
74M PMH GERD, HTN BIBEMS, transferred from [facility] after presenting initially with chest pain. Pt states he's had midsternal/epigastric chest pain, rated 9/10 at worst, non-radiating, worst w rest, alleviated by ambulating, associated w nausea and gas. He has had these symptoms for months however pt symptoms worsening in frequency and thus he was brought to the hospital by his daughter whom he has been visiting since 2/2018 from [facility]. Pt found to have EKG, elevated trop and Pro-bnp (>6000), ASA, Brilinta, Hep loaded and transferred to [facility] for further management.   On 4/15 underwent cardiac cath findings revealed multi vessel CAD; IABP placed and transferred to CCU.  CTS consult called to evaluate patient for cardiac surgery.   ·  PROCEDURES:  18-Apr-2019 CABG x 4 LIMA-LAD, SVG-PL, SVG-OM-OM, IABP, ef 20  post op CABG complicated with cardiogenic shock, lactic acidosis & severe hyperglycemia  SR, back up pacing in place, monitor for post op bleeding, ++ chest tube outputs.   Transfuse products, PRBC cont to monitor serial lactate     Dobutamine gtt,  Pressors to maintain MAP > 70 f/u perfusion indices, volume resuscitation  R leg IAbP with good diastolic augmentation  Glycemic control, INS gtt  Extubated d 1, IABP d/c  Transferred to sdu  4/23 increase lopressor  May transfer to floor  D/c Planning thursday.  4/24 D/c pw, tolerating beta blocker  Transfer to floor  D/c planning for tomorrow

## 2019-04-24 NOTE — PROGRESS NOTE ADULT - REASON FOR ADMISSION
NSTEMI
NSTEMI  Preop CABG
NSTEMI
NSTEMI

## 2019-04-24 NOTE — PROGRESS NOTE ADULT - PROBLEM SELECTOR PLAN 1
Asa, Statin, B-blocker, Chest PT,  Incentive spirometry, wound care and assessment.  Ambulate   Shower pod #5   transfer to floor  Laxatives
Asa, Statin, B-blocker, Chest PT,  Incentive spirometry, wound care and assessment.  Ambulate   Shower pod #5   transfer to floor  Laxatives
CP, w Ischemic changes on EKG (ST depression of lateral leads, T-wave inversion inferior leads), Uptrending troponin: 1179 -> 2116 -> 2681. Pt currently asymptomatic.   -Interventional Cardiology Team aware- no current plan for urgent cardiac cath today, pt currently scheduled for tmrw  -Cont heparin gtt/ Brilinta/ ASA daily  -Will start high dose Lipitor   -Will start Isordil with hold parameters for anginal pain  -F/U A1c, Lipid Panel   -TTE shows LV function roughly 30-35% EF.  -P2Y12 suggests platelets do respond to plavix.
CP, w Ischemic changes on EKG (ST depression of lateral leads, T-wave inversion inferior leads), Uptrending troponin: 1179 -> 2116 -> 2681. Pt currently asymptomatic.   -Interventional Cardiology Team aware- no current plan for urgent cardiac cath today, pt currently scheduled for today  -Will continue high dose Lipitor   -Will continue Isordil with hold parameters for anginal pain  -A1C 5.6%  -TTE shows LV function roughly 30-35% EF.  -P2Y12 suggests platelets do respond to plavix. Current activity at 161.
CP, w Ischemic changes on EKG (ST depression of lateral leads, T-wave inversion inferior leads), Uptrending troponin: 1179 -> 2116 -> 2681. Pt currently asymptomatic.   -Interventional Cardiology Team aware- no current plan for urgent cardiac cath today, pt currently scheduled for today  -Will continue high dose Lipitor   -Will continue Isordil with hold parameters for anginal pain  -A1C 5.6%  -TTE shows LV function roughly 30-35% EF.  -P2Y12 suggests platelets do respond to plavix. Current activity at 155. Will see if surgery will still be able to be done today.

## 2019-04-25 ENCOUNTER — TRANSCRIPTION ENCOUNTER (OUTPATIENT)
Age: 74
End: 2019-04-25

## 2019-04-25 VITALS
TEMPERATURE: 98 F | OXYGEN SATURATION: 97 % | DIASTOLIC BLOOD PRESSURE: 71 MMHG | SYSTOLIC BLOOD PRESSURE: 145 MMHG | RESPIRATION RATE: 18 BRPM | HEART RATE: 69 BPM

## 2019-04-25 LAB
ANION GAP SERPL CALC-SCNC: 10 MMOL/L — SIGNIFICANT CHANGE UP (ref 5–17)
BUN SERPL-MCNC: 13 MG/DL — SIGNIFICANT CHANGE UP (ref 7–23)
CALCIUM SERPL-MCNC: 8.5 MG/DL — SIGNIFICANT CHANGE UP (ref 8.4–10.5)
CHLORIDE SERPL-SCNC: 106 MMOL/L — SIGNIFICANT CHANGE UP (ref 96–108)
CO2 SERPL-SCNC: 20 MMOL/L — LOW (ref 22–31)
CREAT SERPL-MCNC: 1.01 MG/DL — SIGNIFICANT CHANGE UP (ref 0.5–1.3)
GLUCOSE SERPL-MCNC: 77 MG/DL — SIGNIFICANT CHANGE UP (ref 70–99)
HCT VFR BLD CALC: 33 % — LOW (ref 39–50)
HGB BLD-MCNC: 10.9 G/DL — LOW (ref 13–17)
MCHC RBC-ENTMCNC: 32.4 PG — SIGNIFICANT CHANGE UP (ref 27–34)
MCHC RBC-ENTMCNC: 33 GM/DL — SIGNIFICANT CHANGE UP (ref 32–36)
MCV RBC AUTO: 98.1 FL — SIGNIFICANT CHANGE UP (ref 80–100)
PLATELET # BLD AUTO: 278 K/UL — SIGNIFICANT CHANGE UP (ref 150–400)
POTASSIUM SERPL-MCNC: 4.6 MMOL/L — SIGNIFICANT CHANGE UP (ref 3.5–5.3)
POTASSIUM SERPL-SCNC: 4.6 MMOL/L — SIGNIFICANT CHANGE UP (ref 3.5–5.3)
RBC # BLD: 3.37 M/UL — LOW (ref 4.2–5.8)
RBC # FLD: 13.6 % — SIGNIFICANT CHANGE UP (ref 10.3–14.5)
SODIUM SERPL-SCNC: 136 MMOL/L — SIGNIFICANT CHANGE UP (ref 135–145)
WBC # BLD: 11.9 K/UL — HIGH (ref 3.8–10.5)
WBC # FLD AUTO: 11.9 K/UL — HIGH (ref 3.8–10.5)

## 2019-04-25 PROCEDURE — 94010 BREATHING CAPACITY TEST: CPT

## 2019-04-25 PROCEDURE — 99153 MOD SED SAME PHYS/QHP EA: CPT

## 2019-04-25 PROCEDURE — 97116 GAIT TRAINING THERAPY: CPT

## 2019-04-25 PROCEDURE — 84100 ASSAY OF PHOSPHORUS: CPT

## 2019-04-25 PROCEDURE — 81003 URINALYSIS AUTO W/O SCOPE: CPT

## 2019-04-25 PROCEDURE — 85384 FIBRINOGEN ACTIVITY: CPT

## 2019-04-25 PROCEDURE — 86923 COMPATIBILITY TEST ELECTRIC: CPT

## 2019-04-25 PROCEDURE — 96374 THER/PROPH/DIAG INJ IV PUSH: CPT

## 2019-04-25 PROCEDURE — 86901 BLOOD TYPING SEROLOGIC RH(D): CPT

## 2019-04-25 PROCEDURE — P9016: CPT

## 2019-04-25 PROCEDURE — 83605 ASSAY OF LACTIC ACID: CPT

## 2019-04-25 PROCEDURE — 93458 L HRT ARTERY/VENTRICLE ANGIO: CPT

## 2019-04-25 PROCEDURE — 82330 ASSAY OF CALCIUM: CPT

## 2019-04-25 PROCEDURE — 84480 ASSAY TRIIODOTHYRONINE (T3): CPT

## 2019-04-25 PROCEDURE — 86850 RBC ANTIBODY SCREEN: CPT

## 2019-04-25 PROCEDURE — 83036 HEMOGLOBIN GLYCOSYLATED A1C: CPT

## 2019-04-25 PROCEDURE — C1751: CPT

## 2019-04-25 PROCEDURE — C1769: CPT

## 2019-04-25 PROCEDURE — 82565 ASSAY OF CREATININE: CPT

## 2019-04-25 PROCEDURE — C1894: CPT

## 2019-04-25 PROCEDURE — 97162 PT EVAL MOD COMPLEX 30 MIN: CPT

## 2019-04-25 PROCEDURE — 80053 COMPREHEN METABOLIC PANEL: CPT

## 2019-04-25 PROCEDURE — 82803 BLOOD GASES ANY COMBINATION: CPT

## 2019-04-25 PROCEDURE — 87640 STAPH A DNA AMP PROBE: CPT

## 2019-04-25 PROCEDURE — P9047: CPT

## 2019-04-25 PROCEDURE — 93005 ELECTROCARDIOGRAM TRACING: CPT

## 2019-04-25 PROCEDURE — 99285 EMERGENCY DEPT VISIT HI MDM: CPT | Mod: 25

## 2019-04-25 PROCEDURE — 99152 MOD SED SAME PHYS/QHP 5/>YRS: CPT

## 2019-04-25 PROCEDURE — 97530 THERAPEUTIC ACTIVITIES: CPT

## 2019-04-25 PROCEDURE — 84436 ASSAY OF TOTAL THYROXINE: CPT

## 2019-04-25 PROCEDURE — P9037: CPT

## 2019-04-25 PROCEDURE — C1887: CPT

## 2019-04-25 PROCEDURE — 84132 ASSAY OF SERUM POTASSIUM: CPT

## 2019-04-25 PROCEDURE — 85027 COMPLETE CBC AUTOMATED: CPT

## 2019-04-25 PROCEDURE — 80048 BASIC METABOLIC PNL TOTAL CA: CPT

## 2019-04-25 PROCEDURE — 84443 ASSAY THYROID STIM HORMONE: CPT

## 2019-04-25 PROCEDURE — 84295 ASSAY OF SERUM SODIUM: CPT

## 2019-04-25 PROCEDURE — 85610 PROTHROMBIN TIME: CPT

## 2019-04-25 PROCEDURE — 83735 ASSAY OF MAGNESIUM: CPT

## 2019-04-25 PROCEDURE — 80061 LIPID PANEL: CPT

## 2019-04-25 PROCEDURE — P9045: CPT

## 2019-04-25 PROCEDURE — 87641 MR-STAPH DNA AMP PROBE: CPT

## 2019-04-25 PROCEDURE — 93880 EXTRACRANIAL BILAT STUDY: CPT

## 2019-04-25 PROCEDURE — 99239 HOSP IP/OBS DSCHRG MGMT >30: CPT | Mod: 24

## 2019-04-25 PROCEDURE — 82947 ASSAY GLUCOSE BLOOD QUANT: CPT

## 2019-04-25 PROCEDURE — 85576 BLOOD PLATELET AGGREGATION: CPT

## 2019-04-25 PROCEDURE — 82962 GLUCOSE BLOOD TEST: CPT

## 2019-04-25 PROCEDURE — 82553 CREATINE MB FRACTION: CPT

## 2019-04-25 PROCEDURE — 86900 BLOOD TYPING SEROLOGIC ABO: CPT

## 2019-04-25 PROCEDURE — 82435 ASSAY OF BLOOD CHLORIDE: CPT

## 2019-04-25 PROCEDURE — 84484 ASSAY OF TROPONIN QUANT: CPT

## 2019-04-25 PROCEDURE — 85730 THROMBOPLASTIN TIME PARTIAL: CPT

## 2019-04-25 PROCEDURE — 36430 TRANSFUSION BLD/BLD COMPNT: CPT

## 2019-04-25 PROCEDURE — 71045 X-RAY EXAM CHEST 1 VIEW: CPT

## 2019-04-25 PROCEDURE — 94002 VENT MGMT INPAT INIT DAY: CPT

## 2019-04-25 PROCEDURE — 85014 HEMATOCRIT: CPT

## 2019-04-25 PROCEDURE — C1889: CPT

## 2019-04-25 PROCEDURE — 83880 ASSAY OF NATRIURETIC PEPTIDE: CPT

## 2019-04-25 PROCEDURE — 33967 INSERT I-AORT PERCUT DEVICE: CPT

## 2019-04-25 PROCEDURE — 82550 ASSAY OF CK (CPK): CPT

## 2019-04-25 PROCEDURE — C8929: CPT

## 2019-04-25 RX ORDER — OXYCODONE HYDROCHLORIDE 5 MG/1
1 TABLET ORAL
Qty: 20 | Refills: 0 | OUTPATIENT
Start: 2019-04-25 | End: 2019-04-29

## 2019-04-25 RX ORDER — ASPIRIN/CALCIUM CARB/MAGNESIUM 324 MG
1 TABLET ORAL
Qty: 30 | Refills: 0 | OUTPATIENT
Start: 2019-04-25 | End: 2019-05-24

## 2019-04-25 RX ORDER — HYDRALAZINE HCL 50 MG
1 TABLET ORAL
Qty: 90 | Refills: 0 | OUTPATIENT
Start: 2019-04-25 | End: 2019-05-24

## 2019-04-25 RX ORDER — ATORVASTATIN CALCIUM 80 MG/1
1 TABLET, FILM COATED ORAL
Qty: 30 | Refills: 0 | OUTPATIENT
Start: 2019-04-25 | End: 2019-05-24

## 2019-04-25 RX ORDER — LOSARTAN POTASSIUM 100 MG/1
1 TABLET, FILM COATED ORAL
Qty: 30 | Refills: 0 | OUTPATIENT
Start: 2019-04-25 | End: 2019-05-24

## 2019-04-25 RX ORDER — METOPROLOL TARTRATE 50 MG
1 TABLET ORAL
Qty: 90 | Refills: 0 | OUTPATIENT
Start: 2019-04-25 | End: 2019-05-24

## 2019-04-25 RX ORDER — LOSARTAN POTASSIUM 100 MG/1
1 TABLET, FILM COATED ORAL
Qty: 0 | Refills: 0 | COMMUNITY

## 2019-04-25 RX ORDER — METOPROLOL TARTRATE 50 MG
25 TABLET ORAL EVERY 8 HOURS
Qty: 0 | Refills: 0 | Status: DISCONTINUED | OUTPATIENT
Start: 2019-04-25 | End: 2019-04-25

## 2019-04-25 RX ORDER — CLOPIDOGREL BISULFATE 75 MG/1
1 TABLET, FILM COATED ORAL
Qty: 30 | Refills: 0 | OUTPATIENT
Start: 2019-04-25 | End: 2019-05-24

## 2019-04-25 RX ORDER — ACETAMINOPHEN 500 MG
1 TABLET ORAL
Qty: 0 | Refills: 0 | COMMUNITY
Start: 2019-04-25

## 2019-04-25 RX ORDER — AMLODIPINE BESYLATE 2.5 MG/1
1 TABLET ORAL
Qty: 30 | Refills: 0 | OUTPATIENT
Start: 2019-04-25 | End: 2019-05-24

## 2019-04-25 RX ORDER — DOCUSATE SODIUM 100 MG
1 CAPSULE ORAL
Qty: 21 | Refills: 0 | OUTPATIENT
Start: 2019-04-25 | End: 2019-05-01

## 2019-04-25 RX ADMIN — Medication 100 MILLIGRAM(S): at 05:16

## 2019-04-25 RX ADMIN — ENOXAPARIN SODIUM 40 MILLIGRAM(S): 100 INJECTION SUBCUTANEOUS at 13:32

## 2019-04-25 RX ADMIN — Medication 25 MILLIGRAM(S): at 05:15

## 2019-04-25 RX ADMIN — AMLODIPINE BESYLATE 10 MILLIGRAM(S): 2.5 TABLET ORAL at 05:15

## 2019-04-25 RX ADMIN — LOSARTAN POTASSIUM 100 MILLIGRAM(S): 100 TABLET, FILM COATED ORAL at 05:15

## 2019-04-25 RX ADMIN — PANTOPRAZOLE SODIUM 40 MILLIGRAM(S): 20 TABLET, DELAYED RELEASE ORAL at 05:18

## 2019-04-25 RX ADMIN — CLOPIDOGREL BISULFATE 75 MILLIGRAM(S): 75 TABLET, FILM COATED ORAL at 13:27

## 2019-04-25 RX ADMIN — Medication 325 MILLIGRAM(S): at 13:27

## 2019-04-25 RX ADMIN — Medication 25 MILLIGRAM(S): at 13:28

## 2019-04-25 RX ADMIN — Medication 100 MILLIGRAM(S): at 13:28

## 2019-04-25 RX ADMIN — Medication 100 MILLIGRAM(S): at 05:15

## 2019-04-25 NOTE — DISCHARGE NOTE NURSING/CASE MANAGEMENT/SOCIAL WORK - NSDCCRNAME_GEN_ALL_CORE_FT
[facility] at [facility], [address], first floor, [facility] [address]

## 2019-04-25 NOTE — DISCHARGE NOTE NURSING/CASE MANAGEMENT/SOCIAL WORK - NSDCDPATPORTLINK_GEN_ALL_CORE
You can access the [facility] Portal, offered by [facility], by registering with the following website: [email or web address]

## 2019-04-25 NOTE — DISCHARGE NOTE PROVIDER - NSDCFUADDAPPT_GEN_ALL_CORE_FT
[facility] at [address] [place] [address], [address], [facility] [address] ([phone]). Appointment Friday April 26, 2019 2:00pm with Dr. Gregory Edwards. 1. [facility] at [address], [address], [place] [address] ([phone]). Appointment Friday April 26, 2019 2:00pm with Dr. Gregory Edwards.  2. Follow up with Dr. Valdez on Thursday May 2nd, 2019 at 9:30AM in CTS office at [facility], call [phone] to confirm appointment.   3. [facility] [address] Cardiology Clinic, Appointment Tuesday May 7th, 2019 at 2:30pm with Dr. Saad Arroyo.

## 2019-04-25 NOTE — DISCHARGE NOTE PROVIDER - CARE PROVIDER_API CALL
Norman Valdez)  Surgery; Thoracic Surgery  [address] [place] [address]  Phone: [phone]  Fax: [phone]  Follow Up Time: Norman Valdez)  Surgery; Thoracic Surgery  [address] [place] [address]  Phone: [phone]  Fax: [phone]  Follow Up Time: 2 weeks

## 2019-04-25 NOTE — DISCHARGE NOTE PROVIDER - NSDCACTIVITY_GEN_ALL_CORE
No heavy lifting/straining/Do not make important decisions/Walking - Indoors allowed/Showering allowed/Walking - Outdoors allowed/Do not drive or operate machinery/Stairs allowed

## 2019-04-25 NOTE — DISCHARGE NOTE NURSING/CASE MANAGEMENT/SOCIAL WORK - NSDCFUADDAPPT_GEN_ALL_CORE_FT
[facility] at [address] [place] [address], [address], [facility] [address] ([phone]). Appointment Friday April 26, 2019 2:00pm with Dr. Gregory Edwards. [facility] at [address] [facility], [address], [place] [address] ([phone]). Appointment Friday April 26, 2019 2:00pm with Dr. Gregory Edwards.  [facility], [address] [place] [address], [facility], [facility] [address] ([phone]). Appointment Tuesday May 7, 2019 2:30pm with Dr. Saad Ramirez.

## 2019-04-25 NOTE — DISCHARGE NOTE NURSING/CASE MANAGEMENT/SOCIAL WORK - NSSCTYPOFSERV_GEN_ALL_CORE
a nurse will contact you the day after discharge to set up a home evaluation for nursing, physical therapy, and social work.

## 2019-04-25 NOTE — DISCHARGE NOTE PROVIDER - HOSPITAL COURSE
74M PMH GERD, HTN BIBEMS, transferred from [facility] after presenting initially with chest pain. Pt states he's had midsternal/epigastric chest pain, rated 9/10 at worst, non-radiating, worst w rest, alleviated by ambulating, associated w nausea and gas. He has had these symptoms for months however pt symptoms worsening in frequency and thus he was brought to the hospital by his daughter whom he has been visiting since 2/2018 from [facility]. Pt found to have EKG, elevated trop and Pro-bnp (>6000), ASA, Brilinta, Hep loaded and transferred to [facility] for further management.     On 4/15 underwent cardiac cath findings revealed multi vessel CAD; IABP placed and transferred to CCU.  CTS consult called to evaluate patient for cardiac surgery.     ·  PROCEDURES:    18-Apr-2019 CABG x 4 LIMA-LAD, SVG-PL, SVG-OM-OM, IABP, ef 20    post op CABG complicated with cardiogenic shock, lactic acidosis & severe hyperglycemia    SR, back up pacing in place, monitor for post op bleeding, ++ chest tube outputs.     Transfuse products, PRBC cont to monitor serial lactate       Dobutamine gtt,  Pressors to maintain MAP > 70 f/u perfusion indices, volume resuscitation    R leg IAbP with good diastolic augmentation    Glycemic control, INS gtt    Extubated d 1, IABP d/c    Transferred to sdu    4/23 increase lopressor    May transfer to floor    D/c Planning thursday.    4/24 D/c pw, tolerating beta blocker    Transfer to floor    4/25 VSS, D/C home today.

## 2019-04-25 NOTE — DISCHARGE NOTE PROVIDER - NSDCPNSUBOBJ_GEN_ALL_CORE
PHYSICAL EXAM    Neurology: A&Ox3, nonfocal, no gross deficits    CV : RRR+S1S2    Sternal Wound: MSI CDI RILEY, Stable    Lungs: Respirations non-labored, B/L BS CTA    Abdomen: Soft, NT/ND, +BSx4Q, last BM 4/24    : Voiding without difficulty    Extremities: B/L LE no edema, negative calf tenderness, +PP, RLE SVG incision CDI RILEY          45 minutes face to face spent on total encounter, patient counseling and discharge instructions.

## 2019-04-26 ENCOUNTER — APPOINTMENT (OUTPATIENT)
Dept: INTERNAL MEDICINE | Facility: CLINIC | Age: 74
End: 2019-04-26

## 2019-04-29 PROBLEM — Z78.9 SOCIAL ALCOHOL USE: Status: ACTIVE | Noted: 2019-04-29

## 2019-04-29 PROBLEM — I25.2 HISTORY OF MYOCARDIAL INFARCTION: Status: RESOLVED | Noted: 2019-04-29 | Resolved: 2019-04-29

## 2019-04-29 PROBLEM — Z87.891 FORMER SMOKER: Status: ACTIVE | Noted: 2019-04-29

## 2019-04-29 PROBLEM — Z82.49 FAMILY HISTORY OF ESSENTIAL HYPERTENSION: Status: ACTIVE | Noted: 2019-04-29

## 2019-04-29 PROBLEM — Z09 POSTOP CHECK: Status: ACTIVE | Noted: 2019-04-29

## 2019-04-29 PROBLEM — Z83.3 FAMILY HISTORY OF DIABETES MELLITUS: Status: ACTIVE | Noted: 2019-04-29

## 2019-04-30 ENCOUNTER — LABORATORY RESULT (OUTPATIENT)
Age: 74
End: 2019-04-30

## 2019-04-30 ENCOUNTER — APPOINTMENT (OUTPATIENT)
Age: 74
End: 2019-04-30
Payer: SELF-PAY

## 2019-04-30 PROCEDURE — 99024 POSTOP FOLLOW-UP VISIT: CPT

## 2019-05-01 ENCOUNTER — FORM ENCOUNTER (OUTPATIENT)
Age: 74
End: 2019-05-01

## 2019-05-01 ENCOUNTER — OUTPATIENT (OUTPATIENT)
Dept: OUTPATIENT SERVICES | Facility: HOSPITAL | Age: 74
LOS: 1 days | End: 2019-05-01
Payer: SELF-PAY

## 2019-05-01 ENCOUNTER — APPOINTMENT (OUTPATIENT)
Dept: INTERNAL MEDICINE | Facility: CLINIC | Age: 74
End: 2019-05-01

## 2019-05-01 ENCOUNTER — LABORATORY RESULT (OUTPATIENT)
Age: 74
End: 2019-05-01

## 2019-05-01 VITALS
WEIGHT: 165 LBS | SYSTOLIC BLOOD PRESSURE: 112 MMHG | HEART RATE: 73 BPM | DIASTOLIC BLOOD PRESSURE: 70 MMHG | OXYGEN SATURATION: 98 % | BODY MASS INDEX: 22.35 KG/M2 | HEIGHT: 72 IN

## 2019-05-01 DIAGNOSIS — Z90.3 ACQUIRED ABSENCE OF STOMACH [PART OF]: Chronic | ICD-10-CM

## 2019-05-01 DIAGNOSIS — I10 ESSENTIAL (PRIMARY) HYPERTENSION: ICD-10-CM

## 2019-05-01 RX ORDER — ASPIRIN 325 MG/1
325 TABLET, FILM COATED ORAL DAILY
Refills: 0 | Status: ACTIVE | COMMUNITY

## 2019-05-01 NOTE — ASSESSMENT
[FreeTextEntry1] : 74M PMH GERD, HTN \par On 18-Apr-2019 s/p CABG x 4 LIMA-LAD, SVG-PL, SVG-OM-OM, IABP, ef 20 \par

## 2019-05-01 NOTE — ASSESSMENT
[FreeTextEntry1] : Patient is a 74 years old male visiting from [facility] with hx of BPH with recent hospitalization for CABG x 4 who presents to [facility] care. \par \par ###CABG: on high dose ASA and Plavix. c/w Metoprolol, Losartan, Hydralazine and Amlodipine. \par Stable from cardiac perspective. repeat TTE in 3 months-EF ~30%. May need ICD evaluation after 90 days of OMT--f/u with cardiology on 5/7. Will consider diuretic during next visit \par \par ###BPH: IPSS score of 4--mild. No need for Flomax for now. Will check UA as he reports mild dysuria and occasional retention\par \par ###HCM; Bloodwork and urine tests aborted as patient would have had to pay out of pocket. Prevnar and TDAP today \par \par RTC in 1 month for cpe\par \par d/w Dr. Man

## 2019-05-01 NOTE — PHYSICAL EXAM
[No Acute Distress] : no acute distress [Well Developed] : well developed [Well-Appearing] : well-appearing [Well Nourished] : well nourished [Normal Sclera/Conjunctiva] : normal sclera/conjunctiva [PERRL] : pupils equal round and reactive to light [Normal Outer Ear/Nose] : the outer ears and nose were normal in appearance [EOMI] : extraocular movements intact [Normal Oropharynx] : the oropharynx was normal [No JVD] : no jugular venous distention [Supple] : supple [Thyroid Normal, No Nodules] : the thyroid was normal and there were no nodules present [No Lymphadenopathy] : no lymphadenopathy [No Respiratory Distress] : no respiratory distress  [No Accessory Muscle Use] : no accessory muscle use [Normal Rate] : normal rate  [Normal S1, S2] : normal S1 and S2 [Regular Rhythm] : with a regular rhythm [No Murmur] : no murmur heard [No Varicosities] : no varicosities [No Carotid Bruits] : no carotid bruits [No Abdominal Bruit] : a ~M bruit was not heard ~T in the abdomen [No Edema] : there was no peripheral edema [No Extremity Clubbing/Cyanosis] : no extremity clubbing/cyanosis [Pedal Pulses Present] : the pedal pulses are present [Non Tender] : non-tender [No Palpable Aorta] : no palpable aorta [Soft] : abdomen soft [Non-distended] : non-distended [No Masses] : no abdominal mass palpated [No HSM] : no HSM [Normal Bowel Sounds] : normal bowel sounds [Normal Posterior Cervical Nodes] : no posterior cervical lymphadenopathy [Normal Anterior Cervical Nodes] : no anterior cervical lymphadenopathy [No Spinal Tenderness] : no spinal tenderness [No CVA Tenderness] : no CVA  tenderness [No Joint Swelling] : no joint swelling [Grossly Normal Strength/Tone] : grossly normal strength/tone [No Focal Deficits] : no focal deficits [Coordination Grossly Intact] : coordination grossly intact [Normal Gait] : normal gait [Deep Tendon Reflexes (DTR)] : deep tendon reflexes were 2+ and symmetric [Normal Affect] : the affect was normal [Normal Insight/Judgement] : insight and judgment were intact [de-identified] : Left sided dminished breath sounds upto mid lung field  [de-identified] : surgical scar anterior media stinum and abdomen healing well with sutures still in place

## 2019-05-01 NOTE — REVIEW OF SYSTEMS
[Chest Pain] : chest pain [Orthopena] : orthopnea [Negative] : Heme/Lymph [Claudication] : no  leg claudication [Palpitations] : no palpitations [Paroysmal Nocturnal Dyspnea] : no paroysmal nocturnal dyspnea [Lower Ext Edema] : no lower extremity edema

## 2019-05-01 NOTE — PHYSICAL EXAM
[Sclera] : the sclera and conjunctiva were normal [Jugular Venous Distention Increased] : there was no jugular-venous distention [Neck Appearance] : the appearance of the neck was normal [] : no respiratory distress [Decreased Breath Sounds] : decreased breath sounds [Heart Rate And Rhythm] : heart rate was normal and rhythm regular [Heart Sounds] : normal S1 and S2 [Bowel Sounds] : normal bowel sounds [No CVA Tenderness] : no ~M costovertebral angle tenderness [Abdomen Soft] : soft [Abnormal Walk] : normal gait [Skin Color & Pigmentation] : normal skin color and pigmentation [Skin Turgor] : normal skin turgor [FreeTextEntry1] : MTI - approximated , RT leg [No Focal Deficits] : no focal deficits [Oriented To Time, Place, And Person] : oriented to person, place, and time [Affect] : the affect was normal

## 2019-05-01 NOTE — REVIEW OF SYSTEMS
[Feeling Tired] : feeling tired [SOB on Exertion] : shortness of breath during exertion [Negative] : Endocrine

## 2019-05-01 NOTE — HISTORY OF PRESENT ILLNESS
[FreeTextEntry1] : FOLLOW YOUR HEART HOME VISIT-[employer]\par Home Visit made to Mr. GARCIA ] . A  patient of Dr Madi Valdez  S/P CABG x4 on 4/18. \par Visiting patient for post discharge transitional care management and post surgical follow up. \par \par On my arrival, he appears well.  Accompanied by his son in-law\par they had question about resuming Bromazepam, script that was given by his PCP back in [facility] due to his complaint of fatigue and not feeling well.  He has currently been off it for at least 20 days not\par \par He is ambulating around without compromise.   \par Not sleeping well\par \par  [Dyslipidemia] : Dyslipidemia [Hypertension] : Hypertension

## 2019-05-01 NOTE — HEALTH RISK ASSESSMENT
[Intercurrent ED visits] : went to ED [Intercurrent hospitalizations] : was admitted to the hospital  [No falls in past year] : Patient reported no falls in the past year [] : No

## 2019-05-01 NOTE — HISTORY OF PRESENT ILLNESS
[Family Member] : family member [FreeTextEntry8] : Patient is a 74 years old male visiting from [facility] with hx of BPH with recent hospitalization for CABG x 4 who presents to [facility] care. He is accompanied by his son in law and daughter who helped provide collateral. He has been doing okay since discharge but his level of physical activity is minimal. He reports mild chest pressure around the site of the surgery. Also reports orthopnea but uses two pillows to sleep which he has been doing for years. No history of smoking or HLD. He has a visiting nurse who came to see him yesterday and encouraged him to increase his physical activity. He feels a bit tired and does not feel like he has as much energy as he used to. Denies anxiety or depression.

## 2019-05-02 ENCOUNTER — APPOINTMENT (OUTPATIENT)
Dept: CARDIOTHORACIC SURGERY | Facility: CLINIC | Age: 74
End: 2019-05-02
Payer: SELF-PAY

## 2019-05-02 VITALS
WEIGHT: 163 LBS | SYSTOLIC BLOOD PRESSURE: 126 MMHG | TEMPERATURE: 98.3 F | OXYGEN SATURATION: 98 % | HEART RATE: 71 BPM | BODY MASS INDEX: 22.08 KG/M2 | RESPIRATION RATE: 16 BRPM | HEIGHT: 72 IN | DIASTOLIC BLOOD PRESSURE: 69 MMHG

## 2019-05-02 DIAGNOSIS — I25.2 OLD MYOCARDIAL INFARCTION: ICD-10-CM

## 2019-05-02 DIAGNOSIS — K21.9 GASTRO-ESOPHAGEAL REFLUX DISEASE W/OUT ESOPHAGITIS: ICD-10-CM

## 2019-05-02 DIAGNOSIS — Z95.1 PRESENCE OF AORTOCORONARY BYPASS GRAFT: ICD-10-CM

## 2019-05-02 DIAGNOSIS — Z78.9 OTHER SPECIFIED HEALTH STATUS: ICD-10-CM

## 2019-05-02 DIAGNOSIS — Z82.49 FAMILY HISTORY OF ISCHEMIC HEART DISEASE AND OTHER DISEASES OF THE CIRCULATORY SYSTEM: ICD-10-CM

## 2019-05-02 DIAGNOSIS — Z86.79 PERSONAL HISTORY OF OTHER DISEASES OF THE CIRCULATORY SYSTEM: ICD-10-CM

## 2019-05-02 DIAGNOSIS — Z87.891 PERSONAL HISTORY OF NICOTINE DEPENDENCE: ICD-10-CM

## 2019-05-02 DIAGNOSIS — Z83.3 FAMILY HISTORY OF DIABETES MELLITUS: ICD-10-CM

## 2019-05-02 DIAGNOSIS — Z09 ENCOUNTER FOR FOLLOW-UP EXAMINATION AFTER COMPLETED TREATMENT FOR CONDITIONS OTHER THAN MALIGNANT NEOPLASM: ICD-10-CM

## 2019-05-02 LAB
ANION GAP SERPL CALC-SCNC: 13 MMOL/L
APPEARANCE: ABNORMAL
BASOPHILS # BLD AUTO: 0.07 K/UL
BASOPHILS NFR BLD AUTO: 0.8 %
BILIRUBIN URINE: NEGATIVE
BLOOD URINE: NEGATIVE
BUN SERPL-MCNC: 19 MG/DL
CALCIUM SERPL-MCNC: 9.6 MG/DL
CHLORIDE SERPL-SCNC: 101 MMOL/L
CO2 SERPL-SCNC: 22 MMOL/L
COLOR: YELLOW
CREAT SERPL-MCNC: 1.24 MG/DL
EOSINOPHIL # BLD AUTO: 0.48 K/UL
EOSINOPHIL NFR BLD AUTO: 5.2 %
GLUCOSE QUALITATIVE U: NEGATIVE
GLUCOSE SERPL-MCNC: 147 MG/DL
HCT VFR BLD CALC: 37.4 %
HGB BLD-MCNC: 11.6 G/DL
IMM GRANULOCYTES NFR BLD AUTO: 0.3 %
KETONES URINE: NEGATIVE
LEUKOCYTE ESTERASE URINE: NEGATIVE
LYMPHOCYTES # BLD AUTO: 1.12 K/UL
LYMPHOCYTES NFR BLD AUTO: 12.2 %
MAN DIFF?: NORMAL
MCHC RBC-ENTMCNC: 31 GM/DL
MCHC RBC-ENTMCNC: 31.1 PG
MCV RBC AUTO: 100.3 FL
MONOCYTES # BLD AUTO: 0.62 K/UL
MONOCYTES NFR BLD AUTO: 6.7 %
NEUTROPHILS # BLD AUTO: 6.87 K/UL
NEUTROPHILS NFR BLD AUTO: 74.8 %
NITRITE URINE: NEGATIVE
NT-PROBNP SERPL-SCNC: 5276 PG/ML — HIGH (ref 0–300)
PH URINE: 6
PLATELET # BLD AUTO: 354 K/UL
POTASSIUM SERPL-SCNC: 4.7 MMOL/L
PROTEIN URINE: ABNORMAL
PSA FLD-MCNC: 8.01 NG/ML — HIGH (ref 0–4)
RBC # BLD: 3.73 M/UL
RBC # FLD: 14.5 %
SODIUM SERPL-SCNC: 136 MMOL/L
SPECIFIC GRAVITY URINE: 1.02
UROBILINOGEN URINE: NORMAL
WBC # FLD AUTO: 9.19 K/UL

## 2019-05-02 PROCEDURE — 71046 X-RAY EXAM CHEST 2 VIEWS: CPT | Mod: 26

## 2019-05-02 PROCEDURE — 36415 COLL VENOUS BLD VENIPUNCTURE: CPT

## 2019-05-02 PROCEDURE — 99024 POSTOP FOLLOW-UP VISIT: CPT

## 2019-05-02 PROCEDURE — G0103: CPT

## 2019-05-02 PROCEDURE — 71046 X-RAY EXAM CHEST 2 VIEWS: CPT

## 2019-05-02 PROCEDURE — G0438: CPT

## 2019-05-02 PROCEDURE — 83880 ASSAY OF NATRIURETIC PEPTIDE: CPT

## 2019-05-02 RX ORDER — DOCUSATE SODIUM 100 MG/1
100 CAPSULE ORAL 3 TIMES DAILY
Refills: 0 | Status: COMPLETED | COMMUNITY
End: 2019-05-02

## 2019-05-02 RX ORDER — OXYCODONE HYDROCHLORIDE 5 MG/1
5 CAPSULE ORAL
Refills: 0 | Status: COMPLETED | COMMUNITY
End: 2019-05-02

## 2019-05-02 RX ORDER — LORATADINE 10 MG
17 TABLET,DISINTEGRATING ORAL DAILY
Qty: 1 | Refills: 1 | Status: ACTIVE | COMMUNITY
Start: 2019-05-02

## 2019-05-06 DIAGNOSIS — I25.10 ATHEROSCLEROTIC HEART DISEASE OF NATIVE CORONARY ARTERY WITHOUT ANGINA PECTORIS: ICD-10-CM

## 2019-05-07 ENCOUNTER — OUTPATIENT (OUTPATIENT)
Dept: OUTPATIENT SERVICES | Facility: HOSPITAL | Age: 74
LOS: 1 days | End: 2019-05-07
Payer: SELF-PAY

## 2019-05-07 ENCOUNTER — APPOINTMENT (OUTPATIENT)
Dept: CARDIOLOGY | Facility: HOSPITAL | Age: 74
End: 2019-05-07

## 2019-05-07 VITALS
DIASTOLIC BLOOD PRESSURE: 65 MMHG | OXYGEN SATURATION: 98 % | WEIGHT: 167 LBS | HEIGHT: 70.08 IN | SYSTOLIC BLOOD PRESSURE: 111 MMHG | BODY MASS INDEX: 23.91 KG/M2 | HEART RATE: 62 BPM

## 2019-05-07 DIAGNOSIS — I25.10 ATHEROSCLEROTIC HEART DISEASE OF NATIVE CORONARY ARTERY WITHOUT ANGINA PECTORIS: ICD-10-CM

## 2019-05-07 DIAGNOSIS — Z95.1 PRESENCE OF AORTOCORONARY BYPASS GRAFT: ICD-10-CM

## 2019-05-07 DIAGNOSIS — Z90.3 ACQUIRED ABSENCE OF STOMACH [PART OF]: Chronic | ICD-10-CM

## 2019-05-07 PROCEDURE — G0463: CPT

## 2019-05-07 RX ORDER — ACETAMINOPHEN 325 MG/1
325 TABLET, FILM COATED ORAL
Refills: 0 | Status: DISCONTINUED | COMMUNITY
End: 2019-05-07

## 2019-05-07 RX ORDER — METOPROLOL TARTRATE 25 MG/1
25 TABLET, FILM COATED ORAL
Qty: 30 | Refills: 0 | Status: DISCONTINUED | COMMUNITY
End: 2019-05-07

## 2019-05-10 PROBLEM — Z95.1 S/P CABG X 4: Status: ACTIVE | Noted: 2019-04-29

## 2019-05-10 NOTE — HISTORY OF PRESENT ILLNESS
[FreeTextEntry1] : Patient presents with his family member for translation. He is visiting from [facility] and her temporarily. \par \par 74M PMH GERD, HTN, recently hospitalized at [facility] for midsternal/epigastric chest pain, underwent cardiac cath findings revealed multi vessel CAD; IABP placed and transferred to CCU. He underwent quadruple bypass with a relatively uncomplicated course. CABG x 4 LIMA-LAD, SVG-PL, SVG-OM-OM\par \par His discharge LV function: Severe segmental left ventricular systolic dysfunction.\par ECHO below\par \par Has been feeling well since discharge and taking his medications. \par \par \par Patient name: NEYMAR GARCIA\par YOB: 1945   Age: 74 (M)   MR#: [id number]\par Study Date: 4/21/2019\[id number]: Miriam Warren RDCS\par Study quality: Technically difficult\par Referring Physician: Norman Valdez MD\par Blood Pressure: 143/83 mmHg\par Height: 175 cm\par Weight: 77 kg\par BSA: 1.9 m2\par ------------------------------------------------------------------------\par PROCEDURE: Transthoracic echocardiogram with 2-D, M-Mode\par and complete spectral and color flow Doppler. Verbal\par consent was obtained for injection of  Ultrasonic Enhancing\par Agent following a discussion of risks and benefits.\par Following intravenous injection of Ultrasonic Enhancing\par Agent , harmonic imaging was performed.\par INDICATION: Cardiogenic shock (R57.0)\par ------------------------------------------------------------------------\par Dimensions:    Normal Values:\par LA:     4.4    2.0 - 4.0 cm\par Ao:     3.4    2.0 - 3.8 cm\par SEPTUM: 1.0    0.6 - 1.2 cm\par PWT:    1.0    0.6 - 1.1 cm\par LVIDd:  5.3    3.0 - 5.6 cm\par LVIDs:  4.5    1.8 - 4.0 cm\par Derived variables:\par LVMI: 104 g/m2\par RWT: 0.37\par Fractional short: 15 %\par EF (Visual Estimate): 25-30 %\par Doppler Peak Velocity (m/sec): AoV=1.4\par ------------------------------------------------------------------------\par Observations:\par Mitral Valve: Mild mitral annular calcification. Tethered\par mitral valve leaflets with normal opening. Mild mitral\par regurgitation.\par Aortic Valve/Aorta: Calcified trileaflet aortic valve with\par normal opening. Peak transaortic valve gradient equals 8 mm\par Hg. No aortic valve regurgitation seen. Peak left\par ventricular outflow tract gradient equals 4 mm Hg.\par Aortic Root: 3.4 cm.\par Left Atrium: Moderately dilated left atrium.  LA volume\par index = 42 cc/m2.\par Left Ventricle: Endocardial visualization enhanced with\par intravenous injection of Ultrasonic Enhancing Agent\par (Definity). Left ventricle suboptimally visualized despite\par the intravenous injection of ultrasonic enhancing agent'\par severe segmental left ventricular systolic dysfunction. The\par basal to mid lateral, mid to distal septal, inferior,\par inferolateral, and apical segments are akinetic. The\par anterior wall is poorly visualized. Paradoxical septal\par motion consistent with prior cardiac surgery. No left\par ventricular thrombus. Mild left ventricular enlargement.\par Severe diastolic dysfunction.\par Right Heart: Mild right atrial enlargement. Mild right\par ventricular enlargement with decreased right ventricular\par systolic function. Normal tricuspid valve. Mild tricuspid\par regurgitation. Normal pulmonic valve. Mild pulmonic\par regurgitation.\par Pericardium/Pleura: Normal pericardium with no pericardial\par effusion.\par Hemodynamic: Estimated right atrial pressure is 8 mm Hg.\par Estimated right ventricular systolic pressure equals 40 mm\par Hg, assuming right atrial pressure equals 8 mm Hg,\par consistent with mild pulmonary hypertension.\par ------------------------------------------------------------------------\par Conclusions:\par Technically difficult study.\par 1. Mild mitral annular calcification. Tethered mitral valve\par leaflets with normal opening. Mild mitral regurgitation.\par 2. Calcified trileaflet aortic valve with normal opening.\par No aortic valve regurgitation seen.\par 3. Endocardial visualization enhanced with intravenous\par injection of Ultrasonic Enhancing Agent (Definity). Left\par ventricle suboptimally visualized despite the intravenous\par injection of ultrasonic enhancing agent' severe segmental\par left ventricular systolic dysfunction. The basal to mid\par lateral, mid to distal septal, inferior, inferolateral, and\par apical segments are akinetic. The anterior wall is poorly\par visualized. Paradoxical septal motion consistent with prior\par cardiac surgery. No left ventricular thrombus.\par 4. Severe diastolic dysfunction.\par 5. Mild right ventricular enlargement with decreased right\par ventricular systolic function.\par ------------------------------------------------------------------------\par Confirmed on  4/21/2019 - 14:57:40 by Cindy Rowe M.D.\par ------------------------------------------------------------------------

## 2019-05-10 NOTE — REVIEW OF SYSTEMS
[Fever] : no fever [Chills] : no chills [Blurry Vision] : no blurred vision [Eyeglasses] : not currently wearing eyeglasses [Shortness Of Breath] : no shortness of breath [Chest Pain] : no chest pain [Abdominal Pain] : no abdominal pain [Heartburn] : no heartburn [Dysphagia] : no dysphagia [Joint Pain] : no joint pain [Confusion] : no confusion was observed [Excessive Thirst] : no polydipsia [Easy Bleeding] : no tendency for easy bleeding [Easy Bruising] : no tendency for easy bruising

## 2019-05-10 NOTE — PHYSICAL EXAM
[General Appearance - Well Developed] : well developed [General Appearance - Well Nourished] : well nourished [Normal Conjunctiva] : the conjunctiva exhibited no abnormalities [Normal Oral Mucosa] : normal oral mucosa [Normal Oropharynx] : normal oropharynx [Heart Sounds] : normal S1 and S2 [Arterial Pulses Normal] : the arterial pulses were normal [Auscultation Breath Sounds / Voice Sounds] : lungs were clear to auscultation bilaterally [Petechial Hemorrhages (___cm)] : no petechial hemorrhages [] : no hepato-splenomegaly [Nail Splinter Hemorrhages] : no splinter hemorrhages of the nails [Oriented To Time, Place, And Person] : oriented to person, place, and time [Impaired Insight] : insight and judgment were intact

## 2019-05-13 ENCOUNTER — LABORATORY RESULT (OUTPATIENT)
Age: 74
End: 2019-05-13

## 2019-05-13 ENCOUNTER — APPOINTMENT (OUTPATIENT)
Dept: INTERNAL MEDICINE | Facility: CLINIC | Age: 74
End: 2019-05-13

## 2019-05-13 ENCOUNTER — OUTPATIENT (OUTPATIENT)
Dept: OUTPATIENT SERVICES | Facility: HOSPITAL | Age: 74
LOS: 1 days | End: 2019-05-13
Payer: SELF-PAY

## 2019-05-13 VITALS
DIASTOLIC BLOOD PRESSURE: 60 MMHG | HEIGHT: 70.08 IN | SYSTOLIC BLOOD PRESSURE: 102 MMHG | BODY MASS INDEX: 22.48 KG/M2 | WEIGHT: 157 LBS

## 2019-05-13 DIAGNOSIS — I25.10 ATHEROSCLEROTIC HEART DISEASE OF NATIVE CORONARY ARTERY W/OUT ANGINA PECTORIS: ICD-10-CM

## 2019-05-13 DIAGNOSIS — N40.0 BENIGN PROSTATIC HYPERPLASIA WITHOUT LOWER URINARY TRACT SYMPMS: ICD-10-CM

## 2019-05-13 DIAGNOSIS — R97.20 ELEVATED PROSTATE, SPECIFIC ANTIGEN [PSA]: ICD-10-CM

## 2019-05-13 DIAGNOSIS — R31.29 OTHER MICROSCOPIC HEMATURIA: ICD-10-CM

## 2019-05-13 DIAGNOSIS — Z90.3 ACQUIRED ABSENCE OF STOMACH [PART OF]: Chronic | ICD-10-CM

## 2019-05-13 DIAGNOSIS — I10 ESSENTIAL (PRIMARY) HYPERTENSION: ICD-10-CM

## 2019-05-13 PROCEDURE — G0463: CPT

## 2019-05-13 PROCEDURE — 87086 URINE CULTURE/COLONY COUNT: CPT

## 2019-05-13 PROCEDURE — 81003 URINALYSIS AUTO W/O SCOPE: CPT

## 2019-05-13 NOTE — PLAN
[FreeTextEntry1] : \par \par 75 yo M visiting from [facility] with h/o GERD, HTN, BPH and CAD s/p CABG x4 (4/18/19) being seen for elevated PSA.\par \par #Elevated PSA likely 2/2 BPH, cannot r/o prostate ca\par -hx medication for BPH, declines\par - had lengthy conversation with patient and family regarding elevated PSA and further steps\par - urology referral given today, patient/family to decide whether to pursue further eval as patient is visiting from [facility]\par - ultrasound of prostate ordered for evaluation of post void residual\par \par #Microscopic hematuria \par - ultrasound of kidneys/bladder ordered \par - UA and urine culture\par - will call [phone] ([facility] son-in-law) with results \par \par Seen and discussed with Dr. Cardenas \par Carlota Lopez PGY1

## 2019-05-13 NOTE — HISTORY OF PRESENT ILLNESS
[Patient Declined  Services] : - None: Patient declined  services [Family Member] : family member [FreeTextEntry8] : 75 yo M visiting from [facility] with h/o GERD, HTN, BPH and CAD s/p CABG x4 (4/18/19) presents today to discuss lab results, specifically elevated PSA. Is accompanied by daughter and brother-in-law who translated per patient's request. Patient reports having mild trouble with urination. He has taken medication for BPH in the past but not currently taking per his doctor's recommendation, stopped 5-6 years ago. States that when he urinates, the urine slowly comes out but amount is unchanged. Gets up once overnight to urinate. Symptoms have been chronic and unchanged.  Denies hematuria, dysuria. Reports having insomnia. Patient has concerns about insurance since he is visiting from [facility] and does not have medical insurance. Is on a visa for 6 months, is longterm through. \par \par Meds: amlodipine 10mg, , plavix 75mg, hydralazine 100mg, metoprolol succinate 75mg daily, losartan\par All: NKDA \par SH: negative x3 \par FH: No history of prostate cancer or other cancers

## 2019-05-13 NOTE — REVIEW OF SYSTEMS
[Hesitancy] : hesitancy [Nocturia] : nocturia [Insomnia] : insomnia [Fever] : no fever [Chills] : no chills [Sore Throat] : no sore throat [Vision Problems] : no vision problems [Chest Pain] : no chest pain [Lower Ext Edema] : no lower extremity edema [Abdominal Pain] : no abdominal pain [Shortness Of Breath] : no shortness of breath [Constipation] : no constipation [Melena] : no melena [Dysuria] : no dysuria [Hematuria] : no hematuria [Joint Pain] : no joint pain [Skin Rash] : no skin rash [Headache] : no headache

## 2019-05-13 NOTE — PHYSICAL EXAM
[No Acute Distress] : no acute distress [Well Developed] : well developed [Well Nourished] : well nourished [Normal Sclera/Conjunctiva] : normal sclera/conjunctiva [Well-Appearing] : well-appearing [EOMI] : extraocular movements intact [PERRL] : pupils equal round and reactive to light [Normal Outer Ear/Nose] : the outer ears and nose were normal in appearance [Normal Oropharynx] : the oropharynx was normal [Supple] : supple [No Lymphadenopathy] : no lymphadenopathy [Clear to Auscultation] : lungs were clear to auscultation bilaterally [No Respiratory Distress] : no respiratory distress  [No Accessory Muscle Use] : no accessory muscle use [Normal Rate] : normal rate  [Normal S1, S2] : normal S1 and S2 [No Murmur] : no murmur heard [Regular Rhythm] : with a regular rhythm [No Carotid Bruits] : no carotid bruits [No Varicosities] : no varicosities [No Abdominal Bruit] : a ~M bruit was not heard ~T in the abdomen [No Edema] : there was no peripheral edema [Pedal Pulses Present] : the pedal pulses are present [No Palpable Aorta] : no palpable aorta [Soft] : abdomen soft [No Extremity Clubbing/Cyanosis] : no extremity clubbing/cyanosis [No Masses] : no abdominal mass palpated [Non Tender] : non-tender [Non-distended] : non-distended [No HSM] : no HSM [Normal Sphincter Tone] : normal sphincter tone [Normal Bowel Sounds] : normal bowel sounds [Prostate Enlarged] : was enlarged [No CVA Tenderness] : no CVA  tenderness [Normal Posterior Cervical Nodes] : no posterior cervical lymphadenopathy [Normal Anterior Cervical Nodes] : no anterior cervical lymphadenopathy [No Joint Swelling] : no joint swelling [Grossly Normal Strength/Tone] : grossly normal strength/tone [No Spinal Tenderness] : no spinal tenderness [No Rash] : no rash [Normal Gait] : normal gait [Coordination Grossly Intact] : coordination grossly intact [No Focal Deficits] : no focal deficits [Normal Affect] : the affect was normal [Alert and Oriented x3] : oriented to person, place, and time [Normal Insight/Judgement] : insight and judgment were intact [Prostate Nodule] : did not have a nodule [Prostate Tenderness] : was not tender

## 2019-05-14 ENCOUNTER — LABORATORY RESULT (OUTPATIENT)
Age: 74
End: 2019-05-14

## 2019-05-14 LAB
APPEARANCE UR: CLEAR — SIGNIFICANT CHANGE UP
BILIRUB UR-MCNC: NEGATIVE — SIGNIFICANT CHANGE UP
COLOR SPEC: YELLOW — SIGNIFICANT CHANGE UP
DIFF PNL FLD: NEGATIVE — SIGNIFICANT CHANGE UP
GLUCOSE UR QL: NEGATIVE — SIGNIFICANT CHANGE UP
KETONES UR-MCNC: NEGATIVE — SIGNIFICANT CHANGE UP
LEUKOCYTE ESTERASE UR-ACNC: NEGATIVE — SIGNIFICANT CHANGE UP
NITRITE UR-MCNC: NEGATIVE — SIGNIFICANT CHANGE UP
PH UR: 6 — SIGNIFICANT CHANGE UP (ref 5–8)
PROT UR-MCNC: SIGNIFICANT CHANGE UP
SP GR SPEC: 1.02 — SIGNIFICANT CHANGE UP (ref 1.01–1.02)
UROBILINOGEN FLD QL: SIGNIFICANT CHANGE UP

## 2019-05-15 LAB
CULTURE RESULTS: SIGNIFICANT CHANGE UP
SPECIMEN SOURCE: SIGNIFICANT CHANGE UP

## 2019-05-17 ENCOUNTER — APPOINTMENT (OUTPATIENT)
Dept: ULTRASOUND IMAGING | Facility: CLINIC | Age: 74
End: 2019-05-17

## 2019-05-17 ENCOUNTER — MEDICATION RENEWAL (OUTPATIENT)
Age: 74
End: 2019-05-17

## 2019-05-17 RX ORDER — METOPROLOL SUCCINATE 50 MG/1
50 TABLET, EXTENDED RELEASE ORAL DAILY
Qty: 135 | Refills: 0 | Status: ACTIVE | COMMUNITY
Start: 2019-05-07 | End: 1900-01-01

## 2019-05-17 RX ORDER — CLOPIDOGREL BISULFATE 75 MG/1
75 TABLET, FILM COATED ORAL
Qty: 90 | Refills: 0 | Status: ACTIVE | COMMUNITY
Start: 1900-01-01 | End: 1900-01-01

## 2019-05-17 RX ORDER — AMLODIPINE BESYLATE 10 MG/1
10 TABLET ORAL DAILY
Qty: 90 | Refills: 1 | Status: ACTIVE | COMMUNITY
Start: 1900-01-01 | End: 1900-01-01

## 2019-05-17 RX ORDER — HYDRALAZINE HYDROCHLORIDE 100 MG/1
100 TABLET ORAL
Qty: 270 | Refills: 3 | Status: ACTIVE | COMMUNITY
Start: 1900-01-01 | End: 1900-01-01

## 2019-05-17 RX ORDER — LOSARTAN POTASSIUM 100 MG/1
100 TABLET, FILM COATED ORAL
Qty: 90 | Refills: 3 | Status: ACTIVE | COMMUNITY
Start: 1900-01-01 | End: 1900-01-01

## 2019-05-17 RX ORDER — ATORVASTATIN CALCIUM 40 MG/1
40 TABLET, FILM COATED ORAL
Qty: 90 | Refills: 3 | Status: ACTIVE | COMMUNITY
Start: 1900-01-01 | End: 1900-01-01

## 2019-05-20 DIAGNOSIS — I25.10 ATHEROSCLEROTIC HEART DISEASE OF NATIVE CORONARY ARTERY WITHOUT ANGINA PECTORIS: ICD-10-CM

## 2019-05-20 DIAGNOSIS — N40.0 BENIGN PROSTATIC HYPERPLASIA WITHOUT LOWER URINARY TRACT SYMPTOMS: ICD-10-CM

## 2019-05-20 DIAGNOSIS — R31.29 OTHER MICROSCOPIC HEMATURIA: ICD-10-CM

## 2019-05-20 DIAGNOSIS — R97.20 ELEVATED PROSTATE SPECIFIC ANTIGEN [PSA]: ICD-10-CM

## 2019-12-02 NOTE — ASSESSMENT
[FreeTextEntry1] : 74M PMH GERD, HTN, CAD s/p CABG x 4 LIMA-LAD, SVG-PL, SVG-OM-OM at [facility] with Dr. Valdez.\par \par Has been doing well but will be returning to [facility] this summer. \par \par Went over goal directed medical therapy and the potential need of ICD in the future. \par \par #ICM - stage C. Stable. \par - cont current medical regimen (can increase metoprolol succinate to 75 mg QD)\par - patient will get a follow up echo in [facility]\par - all questions answered\par - letter provided to delay his trip home b/c of recent health issues\par \par STORMY Ramirez MD\par Cardiology Fellow\par \par 
normal

## 2021-09-13 NOTE — DISCHARGE NOTE PROVIDER - NSDCCPCAREPLAN_GEN_ALL_CORE_FT
PRINCIPAL DISCHARGE DIAGNOSIS  Diagnosis: S/P CABG x 4  Assessment and Plan of Treatment: 1. Daily Shower  2. Weight yourself daily and notify any weight gain greater than 2-3 pounds in 24 hours.  3. Regular diet - low fat, low cholesterol, no added salt.  4. Cleanse Midsternal incision and leg incision daily while showering with warm water and mild soap, pat dry and maintain open to air.   5. Follow Cardiac Surgery Do's and Don'ts discharge instructions.   6. No driving until cleared by MD.   7. No heavy lifting nothing greater than 5 pounds until cleared by MD.   8. Call / Notify MD any fever greater than 101.0  9. Increase Activity as tolerated. Cephalexin Counseling: I counseled the patient regarding use of cephalexin as an antibiotic for prophylactic and/or therapeutic purposes. Cephalexin (commonly prescribed under brand name Keflex) is a cephalosporin antibiotic which is active against numerous classes of bacteria, including most skin bacteria. Side effects may include nausea, diarrhea, gastrointestinal upset, rash, hives, yeast infections, and in rare cases, hepatitis, kidney disease, seizures, fever, confusion, neurologic symptoms, and others. Patients with severe allergies to penicillin medications are cautioned that there is about a 10% incidence of cross-reactivity with cephalosporins. When possible, patients with penicillin allergies should use alternatives to cephalosporins for antibiotic therapy.

## 2022-03-08 NOTE — DIETITIAN INITIAL EVALUATION ADULT. - PROBLEM SELECTOR PROBLEM 3
Symone Taylor called wanting lab results faxed over. [phone]. Custody paperwork is on file.    English Metabolic acidosis
